# Patient Record
Sex: FEMALE | Race: WHITE | NOT HISPANIC OR LATINO
[De-identification: names, ages, dates, MRNs, and addresses within clinical notes are randomized per-mention and may not be internally consistent; named-entity substitution may affect disease eponyms.]

---

## 2017-05-26 ENCOUNTER — APPOINTMENT (OUTPATIENT)
Dept: INTERNAL MEDICINE | Facility: CLINIC | Age: 82
End: 2017-05-26

## 2017-08-08 DIAGNOSIS — M85.80 OTHER SPECIFIED DISORDERS OF BONE DENSITY AND STRUCTURE, UNSPECIFIED SITE: ICD-10-CM

## 2017-08-08 DIAGNOSIS — E78.5 HYPERLIPIDEMIA, UNSPECIFIED: ICD-10-CM

## 2017-08-08 DIAGNOSIS — E03.9 HYPOTHYROIDISM, UNSPECIFIED: ICD-10-CM

## 2017-08-08 DIAGNOSIS — I10 ESSENTIAL (PRIMARY) HYPERTENSION: ICD-10-CM

## 2017-08-08 DIAGNOSIS — Z00.00 ENCOUNTER FOR GENERAL ADULT MEDICAL EXAMINATION W/OUT ABNORMAL FINDINGS: ICD-10-CM

## 2017-08-08 DIAGNOSIS — F32.9 MAJOR DEPRESSIVE DISORDER, SINGLE EPISODE, UNSPECIFIED: ICD-10-CM

## 2017-08-08 DIAGNOSIS — E55.9 VITAMIN D DEFICIENCY, UNSPECIFIED: ICD-10-CM

## 2017-08-08 DIAGNOSIS — Z91.19 PATIENT'S NONCOMPLIANCE WITH OTHER MEDICAL TREATMENT AND REGIMEN: ICD-10-CM

## 2017-08-29 ENCOUNTER — MEDICATION RENEWAL (OUTPATIENT)
Age: 82
End: 2017-08-29

## 2017-09-28 ENCOUNTER — OTHER (OUTPATIENT)
Age: 82
End: 2017-09-28

## 2017-10-10 ENCOUNTER — APPOINTMENT (OUTPATIENT)
Dept: INTERNAL MEDICINE | Facility: CLINIC | Age: 82
End: 2017-10-10
Payer: MEDICARE

## 2017-10-10 VITALS
OXYGEN SATURATION: 96 % | WEIGHT: 151 LBS | HEIGHT: 63 IN | DIASTOLIC BLOOD PRESSURE: 70 MMHG | TEMPERATURE: 98.6 F | HEART RATE: 76 BPM | RESPIRATION RATE: 22 BRPM | BODY MASS INDEX: 26.75 KG/M2 | SYSTOLIC BLOOD PRESSURE: 118 MMHG

## 2017-10-10 DIAGNOSIS — E55.9 VITAMIN D DEFICIENCY, UNSPECIFIED: ICD-10-CM

## 2017-10-10 PROCEDURE — 99214 OFFICE O/P EST MOD 30 MIN: CPT | Mod: 25

## 2017-10-10 PROCEDURE — 96372 THER/PROPH/DIAG INJ SC/IM: CPT

## 2017-10-10 RX ORDER — CYANOCOBALAMIN 1000 UG/ML
1000 INJECTION INTRAMUSCULAR; SUBCUTANEOUS
Qty: 0 | Refills: 0 | Status: COMPLETED | OUTPATIENT
Start: 2017-10-10

## 2017-10-10 RX ADMIN — CYANOCOBALAMIN 0 MCG/ML: 1000 INJECTION, SOLUTION INTRAMUSCULAR at 00:00

## 2017-11-17 ENCOUNTER — RX RENEWAL (OUTPATIENT)
Age: 82
End: 2017-11-17

## 2017-12-05 LAB
CHOLEST SERPL-MCNC: 299
GLUCOSE SERPL-MCNC: 100
HDLC SERPL-MCNC: 59
LDLC SERPL CALC-MCNC: 201

## 2017-12-08 ENCOUNTER — EMERGENCY (EMERGENCY)
Facility: HOSPITAL | Age: 82
LOS: 0 days | Discharge: HOME | End: 2017-12-08
Attending: EMERGENCY MEDICINE | Admitting: EMERGENCY MEDICINE
Payer: MEDICARE

## 2017-12-08 VITALS
DIASTOLIC BLOOD PRESSURE: 84 MMHG | TEMPERATURE: 98 F | SYSTOLIC BLOOD PRESSURE: 196 MMHG | HEART RATE: 92 BPM | WEIGHT: 119.93 LBS | RESPIRATION RATE: 18 BRPM | HEIGHT: 62 IN

## 2017-12-08 DIAGNOSIS — S39.92XA UNSPECIFIED INJURY OF LOWER BACK, INITIAL ENCOUNTER: ICD-10-CM

## 2017-12-08 DIAGNOSIS — M51.36 OTHER INTERVERTEBRAL DISC DEGENERATION, LUMBAR REGION: ICD-10-CM
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
## 2017-12-08 PROCEDURE — 70450 CT HEAD/BRAIN W/O DYE: CPT | Mod: 26

## 2017-12-08 PROCEDURE — 72131 CT LUMBAR SPINE W/O DYE: CPT | Mod: 26

## 2017-12-08 PROCEDURE — 72100 X-RAY EXAM L-S SPINE 2/3 VWS: CPT | Mod: 26

## 2017-12-08 PROCEDURE — 99283 EMERGENCY DEPT VISIT LOW MDM: CPT | Mod: 25

## 2017-12-08 RX ORDER — ACETAMINOPHEN 500 MG
650 TABLET ORAL ONCE
Qty: 0 | Refills: 0 | Status: COMPLETED | OUTPATIENT
Start: 2017-12-08 | End: 2017-12-08

## 2017-12-08 RX ADMIN — Medication 650 MILLIGRAM(S): at 05:20

## 2017-12-08 NOTE — ED ADULT NURSE NOTE - NS ED NURSE DC INFO COMPLEXITY
Straightforward: Basic instructions, no meds, no home treatment/Simple: Patient demonstrates quick and easy understanding/Verbalized Understanding/Patient asked questions

## 2017-12-08 NOTE — ED ADULT TRIAGE NOTE - CHIEF COMPLAINT QUOTE
patient states she was getting out of a recliner, landed on her back. denies head injury or blood thinners.  pt c/o lower back pain, unable to ambulate due to pain.

## 2017-12-08 NOTE — ED ADULT NURSE NOTE - OBJECTIVE STATEMENT
Pt presents to ER s/p mechanical fall out of recliner chair while at home. Pt denies blood thinners, LOC, CP. Pt presents to ER unable to ambulate, denies numbness/tingling. Skin is warm, dry and intact. AO x 3 oriented to baseline, normal breathing pattern with no difficulty

## 2017-12-08 NOTE — ED PROVIDER NOTE - PROGRESS NOTE DETAILS
No saddle anesthesia no urinary or bowel incontinence. Able to ambulate with assistance. CT brain neg. CT lumbar showing degenerative disc disease and herniations, patient knows of this and it was discussed at [facility] in front of daughter and . This is all known disease. It was discussed at [facility] the need for use of her walker and waiting for assistance to ambulate for fear of further falling. Pt verbalized understanding. Pt wants to leave ED and go home. Stable entire time. Return for further concerns or complaints,

## 2017-12-08 NOTE — ED PROVIDER NOTE - OBJECTIVE STATEMENT
Pt comes to the ED complaining of falling today in the morning. Pt states that she got up to go to bed and turned the lights off and then fell. Pt states fell back striking her buttocks on the floor. No LOC and no obvious deformities. Pt with TTP of the coccyx area. As per daughter patient has trouble ambulating in general and is supposed to use a walker but does not and falls constantly. Has been in rehab for this prior and had aide come to the house to help with PT but she threw them out because she no longer liked taking orders.

## 2018-01-17 NOTE — ED ADULT NURSE NOTE - NS ED NURSE RECORD ANOTHER HT AND WT
Initial Anesthesia Post-op Note    Patient: Ashley Payne  Procedure(s) Performed: LEFT FIRST METATARSAL IMPLANT PHALANGEAL JOINT AND EXCISION SOFT TISSUE MASS X  Anesthesia type: General    Last 24 I/O:   Intake/Output Summary (Last 24 hours) at 01/17/18 1505  Last data filed at 01/17/18 1505   Gross per 24 hour   Intake              100 ml   Output                0 ml   Net              100 ml       PATIENT LOCATION: PACU Phase 1  POST-OP VITAL SIGNS: stable  LEVEL OF CONSCIOUSNESS: participates in exam, answers questions appropriately and responds to stimulation  RESPIRATORY STATUS: spontaneous ventilation, unassisted and face mask  CARDIOVASCULAR: blood pressure returned to baseline  HYDRATION: euvolemic    PAIN MANAGEMENT: well controlled  NAUSEA: None  AIRWAY PATENCY: patent  POST-OP ASSESSMENT: no complications, patient tolerated procedure well with no complications and no evidence of recall  COMPLICATIONS: none  HANDOFF:  Handoff to receiving nurse was performed and questions were answered      
Yes

## 2018-03-06 ENCOUNTER — RX RENEWAL (OUTPATIENT)
Age: 83
End: 2018-03-06

## 2018-03-12 ENCOUNTER — RX RENEWAL (OUTPATIENT)
Age: 83
End: 2018-03-12

## 2018-03-20 ENCOUNTER — RX RENEWAL (OUTPATIENT)
Age: 83
End: 2018-03-20

## 2018-04-13 ENCOUNTER — APPOINTMENT (OUTPATIENT)
Dept: INTERNAL MEDICINE | Facility: CLINIC | Age: 83
End: 2018-04-13

## 2018-04-23 ENCOUNTER — APPOINTMENT (OUTPATIENT)
Dept: INTERNAL MEDICINE | Facility: CLINIC | Age: 83
End: 2018-04-23
Payer: MEDICARE

## 2018-04-23 VITALS
HEART RATE: 87 BPM | WEIGHT: 133 LBS | TEMPERATURE: 97.6 F | OXYGEN SATURATION: 97 % | BODY MASS INDEX: 23.57 KG/M2 | DIASTOLIC BLOOD PRESSURE: 58 MMHG | SYSTOLIC BLOOD PRESSURE: 126 MMHG | RESPIRATION RATE: 22 BRPM | HEIGHT: 63 IN

## 2018-04-23 PROCEDURE — 90732 PPSV23 VACC 2 YRS+ SUBQ/IM: CPT | Mod: GY,GA

## 2018-04-23 PROCEDURE — 99214 OFFICE O/P EST MOD 30 MIN: CPT | Mod: 25

## 2018-04-23 PROCEDURE — G0009: CPT

## 2018-04-29 ENCOUNTER — EMERGENCY (EMERGENCY)
Facility: HOSPITAL | Age: 83
LOS: 0 days | Discharge: HOME | End: 2018-04-29
Attending: EMERGENCY MEDICINE | Admitting: EMERGENCY MEDICINE
Payer: MEDICARE

## 2018-04-29 VITALS
HEART RATE: 81 BPM | SYSTOLIC BLOOD PRESSURE: 190 MMHG | TEMPERATURE: 98 F | OXYGEN SATURATION: 100 % | WEIGHT: 179.9 LBS | HEIGHT: 66 IN | RESPIRATION RATE: 18 BRPM | DIASTOLIC BLOOD PRESSURE: 68 MMHG

## 2018-04-29 DIAGNOSIS — Z96.652 PRESENCE OF LEFT ARTIFICIAL KNEE JOINT: ICD-10-CM

## 2018-04-29 DIAGNOSIS — S49.91XA UNSPECIFIED INJURY OF RIGHT SHOULDER AND UPPER ARM, INITIAL ENCOUNTER: ICD-10-CM

## 2018-04-29 DIAGNOSIS — Z79.82 LONG TERM (CURRENT) USE OF ASPIRIN: ICD-10-CM
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
## 2018-04-29 DIAGNOSIS — Z96.642 PRESENCE OF LEFT ARTIFICIAL HIP JOINT: ICD-10-CM

## 2018-04-29 DIAGNOSIS — I10 ESSENTIAL (PRIMARY) HYPERTENSION: ICD-10-CM

## 2018-04-29 DIAGNOSIS — S40.011A CONTUSION OF RIGHT SHOULDER, INITIAL ENCOUNTER: ICD-10-CM
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
## 2018-04-29 LAB
ALBUMIN SERPL ELPH-MCNC: 4 G/DL — SIGNIFICANT CHANGE UP (ref 3.3–5)
ALP SERPL-CCNC: 84 U/L — SIGNIFICANT CHANGE UP (ref 40–120)
ALT FLD-CCNC: 21 U/L — SIGNIFICANT CHANGE UP (ref 12–78)
ANION GAP SERPL CALC-SCNC: 7 MMOL/L — SIGNIFICANT CHANGE UP (ref 5–17)
APTT BLD: 26.5 SEC — LOW (ref 27.5–37.4)
AST SERPL-CCNC: 18 U/L — SIGNIFICANT CHANGE UP (ref 15–37)
BASOPHILS # BLD AUTO: 0.04 K/UL — SIGNIFICANT CHANGE UP (ref 0–0.2)
BASOPHILS NFR BLD AUTO: 0.6 % — SIGNIFICANT CHANGE UP (ref 0–2)
BILIRUB SERPL-MCNC: 0.3 MG/DL — SIGNIFICANT CHANGE UP (ref 0.2–1.2)
BLD GP AB SCN SERPL QL: SIGNIFICANT CHANGE UP
BUN SERPL-MCNC: 34 MG/DL — HIGH (ref 7–23)
CALCIUM SERPL-MCNC: 9.3 MG/DL — SIGNIFICANT CHANGE UP (ref 8.5–10.1)
CHLORIDE SERPL-SCNC: 113 MMOL/L — HIGH (ref 96–108)
CO2 SERPL-SCNC: 24 MMOL/L — SIGNIFICANT CHANGE UP (ref 22–31)
CREAT SERPL-MCNC: 1.72 MG/DL — HIGH (ref 0.5–1.3)
EOSINOPHIL # BLD AUTO: 0.31 K/UL — SIGNIFICANT CHANGE UP (ref 0–0.5)
EOSINOPHIL NFR BLD AUTO: 4.7 % — SIGNIFICANT CHANGE UP (ref 0–6)
GLUCOSE SERPL-MCNC: 110 MG/DL — HIGH (ref 70–99)
HCT VFR BLD CALC: 35.8 % — SIGNIFICANT CHANGE UP (ref 34.5–45)
HGB BLD-MCNC: 11.8 G/DL — SIGNIFICANT CHANGE UP (ref 11.5–15.5)
IMM GRANULOCYTES NFR BLD AUTO: 0.5 % — SIGNIFICANT CHANGE UP (ref 0–1.5)
INR BLD: 0.97 RATIO — SIGNIFICANT CHANGE UP (ref 0.88–1.16)
LYMPHOCYTES # BLD AUTO: 0.81 K/UL — LOW (ref 1–3.3)
LYMPHOCYTES # BLD AUTO: 12.2 % — LOW (ref 13–44)
MCHC RBC-ENTMCNC: 30.9 PG — SIGNIFICANT CHANGE UP (ref 27–34)
MCHC RBC-ENTMCNC: 33 GM/DL — SIGNIFICANT CHANGE UP (ref 32–36)
MCV RBC AUTO: 93.7 FL — SIGNIFICANT CHANGE UP (ref 80–100)
MONOCYTES # BLD AUTO: 0.46 K/UL — SIGNIFICANT CHANGE UP (ref 0–0.9)
MONOCYTES NFR BLD AUTO: 6.9 % — SIGNIFICANT CHANGE UP (ref 2–14)
NEUTROPHILS # BLD AUTO: 5.01 K/UL — SIGNIFICANT CHANGE UP (ref 1.8–7.4)
NEUTROPHILS NFR BLD AUTO: 75.1 % — SIGNIFICANT CHANGE UP (ref 43–77)
NRBC # BLD: 0 /100 WBCS — SIGNIFICANT CHANGE UP (ref 0–0)
PLATELET # BLD AUTO: 175 K/UL — SIGNIFICANT CHANGE UP (ref 150–400)
POTASSIUM SERPL-MCNC: 3.9 MMOL/L — SIGNIFICANT CHANGE UP (ref 3.5–5.3)
POTASSIUM SERPL-SCNC: 3.9 MMOL/L — SIGNIFICANT CHANGE UP (ref 3.5–5.3)
PROT SERPL-MCNC: 8 GM/DL — SIGNIFICANT CHANGE UP (ref 6–8.3)
PROTHROM AB SERPL-ACNC: 10.5 SEC — SIGNIFICANT CHANGE UP (ref 9.8–12.7)
RBC # BLD: 3.82 M/UL — SIGNIFICANT CHANGE UP (ref 3.8–5.2)
RBC # FLD: 12.4 % — SIGNIFICANT CHANGE UP (ref 10.3–14.5)
SODIUM SERPL-SCNC: 144 MMOL/L — SIGNIFICANT CHANGE UP (ref 135–145)
TYPE + AB SCN PNL BLD: SIGNIFICANT CHANGE UP
WBC # BLD: 6.66 K/UL — SIGNIFICANT CHANGE UP (ref 3.8–10.5)
WBC # FLD AUTO: 6.66 K/UL — SIGNIFICANT CHANGE UP (ref 3.8–10.5)

## 2018-04-29 PROCEDURE — 73200 CT UPPER EXTREMITY W/O DYE: CPT | Mod: 26,RT

## 2018-04-29 PROCEDURE — 73030 X-RAY EXAM OF SHOULDER: CPT | Mod: 26,RT

## 2018-04-29 PROCEDURE — 74176 CT ABD & PELVIS W/O CONTRAST: CPT | Mod: 26

## 2018-04-29 PROCEDURE — 72125 CT NECK SPINE W/O DYE: CPT | Mod: 26

## 2018-04-29 PROCEDURE — 71100 X-RAY EXAM RIBS UNI 2 VIEWS: CPT | Mod: 26,RT

## 2018-04-29 PROCEDURE — 70450 CT HEAD/BRAIN W/O DYE: CPT | Mod: 26

## 2018-04-29 PROCEDURE — 99284 EMERGENCY DEPT VISIT MOD MDM: CPT

## 2018-04-29 PROCEDURE — 76377 3D RENDER W/INTRP POSTPROCES: CPT | Mod: 26

## 2018-04-29 PROCEDURE — 93010 ELECTROCARDIOGRAM REPORT: CPT

## 2018-04-29 PROCEDURE — 73060 X-RAY EXAM OF HUMERUS: CPT | Mod: 26,RT

## 2018-04-29 PROCEDURE — 72190 X-RAY EXAM OF PELVIS: CPT | Mod: 26

## 2018-04-29 PROCEDURE — 71250 CT THORAX DX C-: CPT | Mod: 26

## 2018-04-29 RX ORDER — SODIUM CHLORIDE 9 MG/ML
500 INJECTION INTRAMUSCULAR; INTRAVENOUS; SUBCUTANEOUS ONCE
Qty: 0 | Refills: 0 | Status: COMPLETED | OUTPATIENT
Start: 2018-04-29 | End: 2018-04-29

## 2018-04-29 RX ORDER — ONDANSETRON 8 MG/1
4 TABLET, FILM COATED ORAL ONCE
Qty: 0 | Refills: 0 | Status: COMPLETED | OUTPATIENT
Start: 2018-04-29 | End: 2018-04-29

## 2018-04-29 RX ORDER — MORPHINE SULFATE 50 MG/1
4 CAPSULE, EXTENDED RELEASE ORAL ONCE
Qty: 0 | Refills: 0 | Status: DISCONTINUED | OUTPATIENT
Start: 2018-04-29 | End: 2018-04-29

## 2018-04-29 RX ADMIN — SODIUM CHLORIDE 500 MILLILITER(S): 9 INJECTION INTRAMUSCULAR; INTRAVENOUS; SUBCUTANEOUS at 14:16

## 2018-04-29 RX ADMIN — MORPHINE SULFATE 4 MILLIGRAM(S): 50 CAPSULE, EXTENDED RELEASE ORAL at 14:00

## 2018-04-29 RX ADMIN — ONDANSETRON 4 MILLIGRAM(S): 8 TABLET, FILM COATED ORAL at 13:55

## 2018-04-29 NOTE — ED ADULT TRIAGE NOTE - CHIEF COMPLAINT QUOTE
Pt was folding laundry, trip over something , she fell into pile of laundry and hit Rt shoulder on dresser , neg head injury , neg loc, fall with witnessed by spouse, NEG HEAD, BACK , neck pain

## 2018-04-29 NOTE — ED PROVIDER NOTE - MEDICAL DECISION MAKING DETAILS
Pt with Hx of HTN on 81 mg ASA BIBA from home presents s/p trip and fall at home onto rt shoulder. No head injury or LOC. Pt now c/o chest wall pain and rt shoulder pain, ttp on exam. Plan TA, XR, CT Chest, AP, head, XR shoulder and pelvis, EKG, labs, pain medication, observe, reassess.

## 2018-04-29 NOTE — ED PROVIDER NOTE - OBJECTIVE STATEMENT
Exam begun 13:05, 86 y/o F on 81 mg ASA daily BIBA from home s/p trip and fall onto carpeted floor with her rt shoulder. Denies head injury or LOC. Unable to get up on her own but denies back or hip pain at this time. Pt now c/o rt shoulder and upper arm pain. Pain is sharp, moderate to severe. No numbness, tingling, or weakness. No B/B changes. Exam begun 13:05, 88 y/o F with Hx of HTN, hip replacement and knee replacement left side, on 81 mg ASA daily BIBA from home s/p trip and fall onto carpeted floor with her rt shoulder. Denies head injury or LOC. Unable to get up on her own but denies back or hip pain at this time. Pt now c/o rt shoulder and upper arm pain. Pain is sharp, moderate to severe. No numbness, tingling, or weakness. No B/B changes.

## 2018-04-29 NOTE — ED PROVIDER NOTE - PROGRESS NOTE DETAILS
Dr. Dodge:  Case signed out to Dr. Chopra:  [][ CT R shoulder,   [] ambulation trial.   Expect pt TBD in arm sling if CT negative & pt + ambulatory. Dr. Dodge:  CTs negative, incl. chest.  XRs R shoulder negative.  Pt still very tender to palpation R chest wall, shoulder.  Ordered CT R shoulder & R rib XRs for further eval. Attending Keysha: Pty ambulatory with walker which is baseline. No fracture on CT of shoulder, however placed sling and instructed to f/u ortho, TERRY KHAN

## 2018-04-29 NOTE — ED PROVIDER NOTE - ENMT, MLM
Airway patent, Nasal mucosa clear. Mouth with mildly dry mucosa. Throat has no vesicles, no oropharyngeal exudates and uvula is midline. NC/AT. No evidence of facial injury. Neck is NT, Supple.

## 2018-04-29 NOTE — ED ADULT NURSE NOTE - PAIN: BODY LOCATION
1. global amnesia resolved  2. no evidence of CVA  3. NSR  4. normal echo    Recommend  discharge home  no lipitor no need for monitor  followup with Dr. Tatum shoulder/chest/Right:

## 2018-04-29 NOTE — ED PROVIDER NOTE - CONSTITUTIONAL, MLM
Well appearing, well nourished, awake, alert, oriented to person, place, time/situation and in no apparent respiratory distress. Moderate distress due to shoulder pain normal...

## 2018-05-07 DIAGNOSIS — R79.9 ABNORMAL FINDING OF BLOOD CHEMISTRY, UNSPECIFIED: ICD-10-CM

## 2018-05-21 ENCOUNTER — RX RENEWAL (OUTPATIENT)
Age: 83
End: 2018-05-21

## 2018-05-21 RX ORDER — LEVOTHYROXINE SODIUM 0.05 MG/1
50 TABLET ORAL
Qty: 90 | Refills: 1 | Status: ACTIVE | COMMUNITY
Start: 2017-11-17 | End: 1900-01-01

## 2018-05-21 RX ORDER — CLONIDINE HYDROCHLORIDE 0.2 MG/1
0.2 TABLET ORAL
Qty: 120 | Refills: 5 | Status: ACTIVE | COMMUNITY
Start: 2018-05-21 | End: 1900-01-01

## 2018-07-16 ENCOUNTER — TRANSCRIPTION ENCOUNTER (OUTPATIENT)
Age: 83
End: 2018-07-16

## 2018-07-22 ENCOUNTER — INPATIENT (INPATIENT)
Facility: HOSPITAL | Age: 83
LOS: 7 days | Discharge: NURSING, REHAB OR LONG-TERM CARE | End: 2018-07-30
Attending: INTERNAL MEDICINE | Admitting: INTERNAL MEDICINE
Payer: MEDICARE

## 2018-07-22 VITALS
RESPIRATION RATE: 18 BRPM | OXYGEN SATURATION: 100 % | DIASTOLIC BLOOD PRESSURE: 78 MMHG | SYSTOLIC BLOOD PRESSURE: 200 MMHG | HEIGHT: 63 IN | HEART RATE: 98 BPM | TEMPERATURE: 98 F | WEIGHT: 199.96 LBS

## 2018-07-22 LAB
ALBUMIN SERPL ELPH-MCNC: 4.1 G/DL — SIGNIFICANT CHANGE UP (ref 3.3–5)
ALP SERPL-CCNC: 77 U/L — SIGNIFICANT CHANGE UP (ref 40–120)
ALT FLD-CCNC: 24 U/L — SIGNIFICANT CHANGE UP (ref 12–78)
ANION GAP SERPL CALC-SCNC: 8 MMOL/L — SIGNIFICANT CHANGE UP (ref 5–17)
APPEARANCE UR: CLEAR — SIGNIFICANT CHANGE UP
APTT BLD: 28.3 SEC — SIGNIFICANT CHANGE UP (ref 27.5–37.4)
AST SERPL-CCNC: 21 U/L — SIGNIFICANT CHANGE UP (ref 15–37)
BACTERIA # UR AUTO: ABNORMAL
BASOPHILS # BLD AUTO: 0.04 K/UL — SIGNIFICANT CHANGE UP (ref 0–0.2)
BASOPHILS NFR BLD AUTO: 0.5 % — SIGNIFICANT CHANGE UP (ref 0–2)
BILIRUB SERPL-MCNC: 0.3 MG/DL — SIGNIFICANT CHANGE UP (ref 0.2–1.2)
BILIRUB UR-MCNC: NEGATIVE — SIGNIFICANT CHANGE UP
BLD GP AB SCN SERPL QL: SIGNIFICANT CHANGE UP
BUN SERPL-MCNC: 26 MG/DL — HIGH (ref 7–23)
CALCIUM SERPL-MCNC: 9.4 MG/DL — SIGNIFICANT CHANGE UP (ref 8.5–10.1)
CHLORIDE SERPL-SCNC: 113 MMOL/L — HIGH (ref 96–108)
CK SERPL-CCNC: 96 U/L — SIGNIFICANT CHANGE UP (ref 26–192)
CO2 SERPL-SCNC: 21 MMOL/L — LOW (ref 22–31)
COLOR SPEC: YELLOW — SIGNIFICANT CHANGE UP
CREAT SERPL-MCNC: 1.45 MG/DL — HIGH (ref 0.5–1.3)
DIFF PNL FLD: NEGATIVE — SIGNIFICANT CHANGE UP
EOSINOPHIL # BLD AUTO: 0.25 K/UL — SIGNIFICANT CHANGE UP (ref 0–0.5)
EOSINOPHIL NFR BLD AUTO: 3.4 % — SIGNIFICANT CHANGE UP (ref 0–6)
EPI CELLS # UR: NEGATIVE — SIGNIFICANT CHANGE UP
GLUCOSE SERPL-MCNC: 101 MG/DL — HIGH (ref 70–99)
GLUCOSE UR QL: NEGATIVE MG/DL — SIGNIFICANT CHANGE UP
HCT VFR BLD CALC: 37 % — SIGNIFICANT CHANGE UP (ref 34.5–45)
HGB BLD-MCNC: 12.2 G/DL — SIGNIFICANT CHANGE UP (ref 11.5–15.5)
HYALINE CASTS # UR AUTO: ABNORMAL /LPF
IMM GRANULOCYTES NFR BLD AUTO: 0.5 % — SIGNIFICANT CHANGE UP (ref 0–1.5)
INR BLD: 0.98 RATIO — SIGNIFICANT CHANGE UP (ref 0.88–1.16)
KETONES UR-MCNC: NEGATIVE — SIGNIFICANT CHANGE UP
LEUKOCYTE ESTERASE UR-ACNC: ABNORMAL
LYMPHOCYTES # BLD AUTO: 1.14 K/UL — SIGNIFICANT CHANGE UP (ref 1–3.3)
LYMPHOCYTES # BLD AUTO: 15.5 % — SIGNIFICANT CHANGE UP (ref 13–44)
MAGNESIUM SERPL-MCNC: 1.6 MG/DL — SIGNIFICANT CHANGE UP (ref 1.6–2.6)
MCHC RBC-ENTMCNC: 30.7 PG — SIGNIFICANT CHANGE UP (ref 27–34)
MCHC RBC-ENTMCNC: 33 GM/DL — SIGNIFICANT CHANGE UP (ref 32–36)
MCV RBC AUTO: 93.2 FL — SIGNIFICANT CHANGE UP (ref 80–100)
MONOCYTES # BLD AUTO: 0.4 K/UL — SIGNIFICANT CHANGE UP (ref 0–0.9)
MONOCYTES NFR BLD AUTO: 5.4 % — SIGNIFICANT CHANGE UP (ref 2–14)
NEUTROPHILS # BLD AUTO: 5.49 K/UL — SIGNIFICANT CHANGE UP (ref 1.8–7.4)
NEUTROPHILS NFR BLD AUTO: 74.7 % — SIGNIFICANT CHANGE UP (ref 43–77)
NITRITE UR-MCNC: NEGATIVE — SIGNIFICANT CHANGE UP
NRBC # BLD: 0 /100 WBCS — SIGNIFICANT CHANGE UP (ref 0–0)
PH UR: 5 — SIGNIFICANT CHANGE UP (ref 5–8)
PLATELET # BLD AUTO: 207 K/UL — SIGNIFICANT CHANGE UP (ref 150–400)
POTASSIUM SERPL-MCNC: 3.9 MMOL/L — SIGNIFICANT CHANGE UP (ref 3.5–5.3)
POTASSIUM SERPL-SCNC: 3.9 MMOL/L — SIGNIFICANT CHANGE UP (ref 3.5–5.3)
PROT SERPL-MCNC: 8.4 GM/DL — HIGH (ref 6–8.3)
PROT UR-MCNC: 30 MG/DL
PROTHROM AB SERPL-ACNC: 10.6 SEC — SIGNIFICANT CHANGE UP (ref 9.8–12.7)
RBC # BLD: 3.97 M/UL — SIGNIFICANT CHANGE UP (ref 3.8–5.2)
RBC # FLD: 12.9 % — SIGNIFICANT CHANGE UP (ref 10.3–14.5)
RBC CASTS # UR COMP ASSIST: ABNORMAL /HPF (ref 0–4)
SODIUM SERPL-SCNC: 142 MMOL/L — SIGNIFICANT CHANGE UP (ref 135–145)
SP GR SPEC: 1.01 — SIGNIFICANT CHANGE UP (ref 1.01–1.02)
TROPONIN I SERPL-MCNC: <0.015 NG/ML — SIGNIFICANT CHANGE UP (ref 0.01–0.04)
TYPE + AB SCN PNL BLD: SIGNIFICANT CHANGE UP
UROBILINOGEN FLD QL: NEGATIVE MG/DL — SIGNIFICANT CHANGE UP
WBC # BLD: 7.36 K/UL — SIGNIFICANT CHANGE UP (ref 3.8–10.5)
WBC # FLD AUTO: 7.36 K/UL — SIGNIFICANT CHANGE UP (ref 3.8–10.5)
WBC UR QL: ABNORMAL

## 2018-07-22 PROCEDURE — 72125 CT NECK SPINE W/O DYE: CPT | Mod: 26

## 2018-07-22 PROCEDURE — 74177 CT ABD & PELVIS W/CONTRAST: CPT | Mod: 26

## 2018-07-22 PROCEDURE — 71260 CT THORAX DX C+: CPT | Mod: 26

## 2018-07-22 PROCEDURE — 70450 CT HEAD/BRAIN W/O DYE: CPT | Mod: 26

## 2018-07-22 PROCEDURE — 93010 ELECTROCARDIOGRAM REPORT: CPT

## 2018-07-22 PROCEDURE — 99285 EMERGENCY DEPT VISIT HI MDM: CPT

## 2018-07-22 PROCEDURE — 71045 X-RAY EXAM CHEST 1 VIEW: CPT | Mod: 26

## 2018-07-22 RX ORDER — LIDOCAINE 4 G/100G
1 CREAM TOPICAL DAILY
Qty: 0 | Refills: 0 | Status: DISCONTINUED | OUTPATIENT
Start: 2018-07-22 | End: 2018-07-30

## 2018-07-22 RX ORDER — SODIUM CHLORIDE 9 MG/ML
1000 INJECTION INTRAMUSCULAR; INTRAVENOUS; SUBCUTANEOUS
Qty: 0 | Refills: 0 | Status: DISCONTINUED | OUTPATIENT
Start: 2018-07-22 | End: 2018-07-23

## 2018-07-22 RX ORDER — TRAMADOL HYDROCHLORIDE 50 MG/1
50 TABLET ORAL ONCE
Qty: 0 | Refills: 0 | Status: DISCONTINUED | OUTPATIENT
Start: 2018-07-22 | End: 2018-07-22

## 2018-07-22 RX ORDER — HYDRALAZINE HCL 50 MG
10 TABLET ORAL THREE TIMES A DAY
Qty: 0 | Refills: 0 | Status: DISCONTINUED | OUTPATIENT
Start: 2018-07-22 | End: 2018-07-24

## 2018-07-22 RX ORDER — MORPHINE SULFATE 50 MG/1
1 CAPSULE, EXTENDED RELEASE ORAL EVERY 6 HOURS
Qty: 0 | Refills: 0 | Status: DISCONTINUED | OUTPATIENT
Start: 2018-07-22 | End: 2018-07-23

## 2018-07-22 RX ORDER — ACETAMINOPHEN 500 MG
1000 TABLET ORAL ONCE
Qty: 0 | Refills: 0 | Status: COMPLETED | OUTPATIENT
Start: 2018-07-22 | End: 2018-07-22

## 2018-07-22 RX ORDER — OXYCODONE AND ACETAMINOPHEN 5; 325 MG/1; MG/1
1 TABLET ORAL EVERY 6 HOURS
Qty: 0 | Refills: 0 | Status: DISCONTINUED | OUTPATIENT
Start: 2018-07-22 | End: 2018-07-25

## 2018-07-22 RX ORDER — ONDANSETRON 8 MG/1
4 TABLET, FILM COATED ORAL EVERY 8 HOURS
Qty: 0 | Refills: 0 | Status: DISCONTINUED | OUTPATIENT
Start: 2018-07-22 | End: 2018-07-22

## 2018-07-22 RX ORDER — ONDANSETRON 8 MG/1
4 TABLET, FILM COATED ORAL EVERY 8 HOURS
Qty: 0 | Refills: 0 | Status: DISCONTINUED | OUTPATIENT
Start: 2018-07-22 | End: 2018-07-30

## 2018-07-22 RX ORDER — AMLODIPINE BESYLATE 2.5 MG/1
5 TABLET ORAL DAILY
Qty: 0 | Refills: 0 | Status: DISCONTINUED | OUTPATIENT
Start: 2018-07-22 | End: 2018-07-24

## 2018-07-22 RX ORDER — ACETAMINOPHEN 500 MG
650 TABLET ORAL EVERY 6 HOURS
Qty: 0 | Refills: 0 | Status: DISCONTINUED | OUTPATIENT
Start: 2018-07-22 | End: 2018-07-30

## 2018-07-22 RX ORDER — LIDOCAINE 4 G/100G
1 CREAM TOPICAL ONCE
Qty: 0 | Refills: 0 | Status: COMPLETED | OUTPATIENT
Start: 2018-07-22 | End: 2018-07-22

## 2018-07-22 RX ORDER — HEPARIN SODIUM 5000 [USP'U]/ML
5000 INJECTION INTRAVENOUS; SUBCUTANEOUS EVERY 12 HOURS
Qty: 0 | Refills: 0 | Status: DISCONTINUED | OUTPATIENT
Start: 2018-07-22 | End: 2018-07-23

## 2018-07-22 RX ORDER — MORPHINE SULFATE 50 MG/1
1 CAPSULE, EXTENDED RELEASE ORAL ONCE
Qty: 0 | Refills: 0 | Status: DISCONTINUED | OUTPATIENT
Start: 2018-07-22 | End: 2018-07-22

## 2018-07-22 RX ADMIN — TRAMADOL HYDROCHLORIDE 50 MILLIGRAM(S): 50 TABLET ORAL at 07:20

## 2018-07-22 RX ADMIN — Medication 400 MILLIGRAM(S): at 00:34

## 2018-07-22 RX ADMIN — LIDOCAINE 1 PATCH: 4 CREAM TOPICAL at 12:14

## 2018-07-22 RX ADMIN — OXYCODONE AND ACETAMINOPHEN 1 TABLET(S): 5; 325 TABLET ORAL at 20:06

## 2018-07-22 RX ADMIN — Medication 1000 MILLIGRAM(S): at 01:04

## 2018-07-22 RX ADMIN — Medication 10 MILLIGRAM(S): at 22:44

## 2018-07-22 RX ADMIN — AMLODIPINE BESYLATE 5 MILLIGRAM(S): 2.5 TABLET ORAL at 09:44

## 2018-07-22 RX ADMIN — LIDOCAINE 1 PATCH: 4 CREAM TOPICAL at 03:04

## 2018-07-22 RX ADMIN — TRAMADOL HYDROCHLORIDE 50 MILLIGRAM(S): 50 TABLET ORAL at 00:37

## 2018-07-22 RX ADMIN — HEPARIN SODIUM 5000 UNIT(S): 5000 INJECTION INTRAVENOUS; SUBCUTANEOUS at 17:24

## 2018-07-22 RX ADMIN — TRAMADOL HYDROCHLORIDE 50 MILLIGRAM(S): 50 TABLET ORAL at 09:46

## 2018-07-22 NOTE — ED ADULT NURSE REASSESSMENT NOTE - NS ED NURSE REASSESS COMMENT FT1
assisted with ambulation to and from the bathroom. Pt tolerated well. Warm blankets provided. Call bell within reach. Awaiting for hospitalist. Will cont to monitor for safety and comfort.

## 2018-07-22 NOTE — ED PROVIDER NOTE - PROGRESS NOTE DETAILS
Lives with one family member who is wheelchair bound who cannot help her if she falls again.  States pt. is not eating and drinking well, is up all night and sleeping on a recliner only all day.  Wants patient to consider rehab.  Prefers [facility] if possible.

## 2018-07-22 NOTE — ED ADULT NURSE NOTE - ED STAT RN HANDOFF DETAILS
Pt received from prior RN alert and able to answer questions appropriately. Pt reports that she had temporary pain relief with medication, but that pain is returned at this time. MD notified and will medicate for pain.  Will monitor for relief. Resp even and unlabored.

## 2018-07-22 NOTE — ED ADULT NURSE REASSESSMENT NOTE - NS ED NURSE REASSESS COMMENT FT1
pt sleeping with no acute distress noted. Call bell within reach. Vitals stable. awaiting for hospitalist. Will cont to monitor for safety and comfort.

## 2018-07-22 NOTE — H&P ADULT - HISTORY OF PRESENT ILLNESS
88y female w/ PMH htn, CKD3 presents w/ left rib pain due to fall.  Pt last seen here in April in ED w/ fall and d/c home. 88y female w/ PMH htn, CKD3, OA, vertigo presents w/ left rib pain due to fall.  Pt last seen here in April in ED w/ fall and d/c home.  Pt states she is clumsy and falls a lot. Denies loc, no dizziness, cp, sob, palp.  Lives w/ daughter.  Pt states she can't recall her bp meds but her daughter will bring.  Complains of 10/10 left side rib pain.  Requesting pain meds.  Then wants to go home.       PMH: htn, ckd3  PSH:  left hip and knee replacement

## 2018-07-22 NOTE — ED ADULT NURSE REASSESSMENT NOTE - NS ED NURSE REASSESS COMMENT FT1
assisted with ambulation to and from the bathroom. Pt c/o worsening pain with movement and ambulation. Urine sample obtained and sent to lab. Updated plan of care to pt and family at bedside. Pt aware of admission. Will cont to monitor for safety and comfort. assisted with ambulation to and from the bathroom. Pt c/o worsening pain with movement and ambulation. Urine sample obtained and sent to lab. Denies sob or chest pain. Updated plan of care to pt and family at bedside. Pt aware of admission. Will cont to monitor for safety and comfort.

## 2018-07-22 NOTE — ED PROVIDER NOTE - CONSTITUTIONAL, MLM
normal... Well appearing, well nourished, awake, alert, oriented to person, place, time/situation and winces when she talks or moves or breathes deep.

## 2018-07-22 NOTE — ED ADULT NURSE REASSESSMENT NOTE - NS ED NURSE REASSESS COMMENT FT1
lidocaine patch applied on left rib. Pt tolerated well. vss. Will cont to monitor for safety and comfort.

## 2018-07-22 NOTE — H&P ADULT - NSHPPHYSICALEXAM_GEN_ALL_CORE
Vital Signs Last 24 Hrs  T(C): 36.7 (22 Jul 2018 07:04), Max: 36.8 (22 Jul 2018 00:05)  T(F): 98 (22 Jul 2018 07:04), Max: 98.2 (22 Jul 2018 00:05)  HR: 85 (22 Jul 2018 07:04) (85 - 98)  BP: 163/72 (22 Jul 2018 07:04) (163/69 - 200/78)  BP(mean): --  RR: 17 (22 Jul 2018 07:04) (17 - 18)  SpO2: 95% (22 Jul 2018 07:04) (95% - 100%) Vital Signs Last 24 Hrs  T(C): 36.7 (22 Jul 2018 07:04), Max: 36.8 (22 Jul 2018 00:05)  T(F): 98 (22 Jul 2018 07:04), Max: 98.2 (22 Jul 2018 00:05)  HR: 85 (22 Jul 2018 07:04) (85 - 98)  BP: 163/72 (22 Jul 2018 07:04) (163/69 - 200/78)  BP(mean): --  RR: 17 (22 Jul 2018 07:04) (17 - 18)  SpO2: 95% (22 Jul 2018 07:04) (95% - 100%)      PHYSICAL EXAM:  General: elderly female lying flat in bed; complaining of left rib pain  Neuro: AAOx3, no focal deficits  HEENT: NCAT, EOMI, dry MM  Neck: Soft and supple, No JVD  Respiratory: CTA b/l, no w/r/r  Cardiovascular: S1 and S2, RRR    - left chest wall tenderness to minimal palpation-  no ecchymosis  ; lidoderm patch   Gastrointestinal: +BS, soft, nonttp ; umbilical hernia   Extremities: No edema  Vascular: 2+ peripheral pulses  Musculoskeletal: 5/5 strength b/l UE and LE, sensation intact  Skin: No rashes

## 2018-07-22 NOTE — ED PROVIDER NOTE - MEDICAL DECISION MAKING DETAILS
Likely rib fractures s/p fall; since poor historian, will do EKG, CXR, labs and CTs of head, c spine, chest, abdomen and pelvis to r/o internal injury.

## 2018-07-22 NOTE — ED PROVIDER NOTE - OBJECTIVE STATEMENT
Pt. is an 87 yo F with a hx of HTN, hip replacement, knee replacement, renal insufficiency shown in labs in April 2018 BIB ambulance for pain in left lower ribs.  Pt. states she has had multiple falls the past few days.  Poor historian and unable to describe last fall.  Pt. c/o sharp pain in left lower ribs and left axilla and flank when she moves, breathes or talks.  Pt. states she lives at home with her daughter and son in law.  She states they are on their way to the hospital with more history. Pt. is an 89 yo F with a hx of HTN, hip replacement, knee replacement, renal insufficiency shown in labs in April 2018 BIB ambulance for pain in left lower ribs.  Pt. states she has had multiple falls the past few days.  Poor historian and unable to describe last fall.  Pt. c/o sharp pain in left lower ribs and left axilla and flank when she moves, breathes or talks.  Pt. states she lives at home with her daughter and son in law.  She states they are on their way to the hospital with more history. PMD Miguelangel

## 2018-07-22 NOTE — ED ADULT NURSE NOTE - OBJECTIVE STATEMENT
89 y/o female awake alert and oriented x4 BIBA c/o sudden onset of left flank pain. Pt states she tripped and fell on right side at home on Monday night. Pt was seen at the Urgent Care on Tuesday. Xray was negative. Denies new fall or injury. Pt describes pain as sharp pain. denies chest pain,sob,ha,dz,n/v/d/fever/chills or urinary sx.

## 2018-07-22 NOTE — ED PROVIDER NOTE - CARE PLAN
Principal Discharge DX:	Falls frequently  Secondary Diagnosis:	Closed fracture of multiple ribs of left side, initial encounter

## 2018-07-22 NOTE — ED PROVIDER NOTE - CARDIAC, MLM
Normal rate, regular rhythm.  Heart sounds S1, S2.  No murmurs, rubs or gallops. +left lower anterior and lateral rib tenderness; no chest crepitus

## 2018-07-22 NOTE — H&P ADULT - NSHPREVIEWOFSYSTEMS_GEN_ALL_CORE
General: No weakness, fevers, chills  HEENT: No HA, neck pain, no visual changes, no hearing loss   Resp: No cough, wheezing, hemoptysis; No shortness of breath  CV: No chest pain or palpitations  GI: No abdominal pain, no n/v/d, no blood in stool  : No f/u/d  Neuro: No weakness or numbness  MSK: left side 10/10 rib pain  SKIN: No itching, rashes, lesions  All other ROS negative unless indicated above.

## 2018-07-22 NOTE — H&P ADULT - NSHPLABSRESULTS_GEN_ALL_CORE
LABS: All Labs Reviewed:                        12.2   7.36  )-----------( 207      ( 22 Jul 2018 00:33 )             37.0     07-22    142  |  113<H>  |  26<H>  ----------------------------<  101<H>  3.9   |  21<L>  |  1.45<H>    Ca    9.4      22 Jul 2018 00:33  Mg     1.6     07-22    TPro  8.4<H>  /  Alb  4.1  /  TBili  0.3  /  DBili  x   /  AST  21  /  ALT  24  /  AlkPhos  77  07-22    PT/INR - ( 22 Jul 2018 00:33 )   PT: 10.6 sec;   INR: 0.98 ratio         PTT - ( 22 Jul 2018 00:33 )  PTT:28.3 sec  CARDIAC MARKERS ( 22 Jul 2018 00:33 )  <0.015 ng/mL / x     / 96 U/L / x     / x        < from: CT Abdomen and Pelvis w/ IV Cont (07.22.18 @ 01:56) >      IMPRESSION:  Chest:  1.  Nondisplaced fractures in the anterolateral aspects of the left   eighth and ninth ribs with small amount of edema/hemorrhage in the   adjacent chest wall.    2.  Age indeterminant moderate compression fracture in the inferior   endplate of T9.    Abdomen/pelvis:  1.  No evidence of acute traumatic injury abdomen or pelvis.   2.  Status post cholecystectomy with severe intrahepatic and extrahepatic   biliary ductal dilatation.  Correlate with liver function tests.  < end of copied text >    < from: CT Head No Cont (07.22.18 @ 01:52) >  IMPRESSION: No intracranial hemorrhage, mass effect, or displaced   calvarial fracture.  < end of copied text >    < from: CT Cervical Spine No Cont (07.22.18 @ 01:52) >  IMPRESSION:   Noevidence of acute intracranial hemorrhage, midline shift or CT   evidence of acute territorial infarct.  If the patient's symptoms persist, consider short interval follow-up head   CT or brain MRI if there are no MRI contraindications.  No acute cervical fracture or traumatic malalignment.  MRI would be required to evaluate the ligamentous structures at higher   sensitivity as well as for better evaluation of the cervical canal and   its contents.  < end of copied text >        MEDICATIONS  (STANDING):  amLODIPine   Tablet 5 milliGRAM(s) Oral daily  heparin  Injectable 5000 Unit(s) SubCutaneous every 12 hours  lidocaine   Patch 1 Patch Transdermal daily  sodium chloride 0.9%. 1000 milliLiter(s) (75 mL/Hr) IV Continuous <Continuous>    MEDICATIONS  (PRN):  acetaminophen   Tablet 650 milliGRAM(s) Oral every 6 hours PRN pain or temp > 100.4

## 2018-07-23 LAB
ANION GAP SERPL CALC-SCNC: 11 MMOL/L — SIGNIFICANT CHANGE UP (ref 5–17)
BUN SERPL-MCNC: 27 MG/DL — HIGH (ref 7–23)
CALCIUM SERPL-MCNC: 9.3 MG/DL — SIGNIFICANT CHANGE UP (ref 8.5–10.1)
CHLORIDE SERPL-SCNC: 111 MMOL/L — HIGH (ref 96–108)
CO2 SERPL-SCNC: 20 MMOL/L — LOW (ref 22–31)
CREAT SERPL-MCNC: 1.38 MG/DL — HIGH (ref 0.5–1.3)
GLUCOSE SERPL-MCNC: 100 MG/DL — HIGH (ref 70–99)
HCT VFR BLD CALC: 37.1 % — SIGNIFICANT CHANGE UP (ref 34.5–45)
HGB BLD-MCNC: 12.2 G/DL — SIGNIFICANT CHANGE UP (ref 11.5–15.5)
POTASSIUM SERPL-MCNC: 3.8 MMOL/L — SIGNIFICANT CHANGE UP (ref 3.5–5.3)
POTASSIUM SERPL-SCNC: 3.8 MMOL/L — SIGNIFICANT CHANGE UP (ref 3.5–5.3)
SODIUM SERPL-SCNC: 142 MMOL/L — SIGNIFICANT CHANGE UP (ref 135–145)

## 2018-07-23 PROCEDURE — 71045 X-RAY EXAM CHEST 1 VIEW: CPT | Mod: 26

## 2018-07-23 PROCEDURE — 99223 1ST HOSP IP/OBS HIGH 75: CPT

## 2018-07-23 RX ORDER — PANTOPRAZOLE SODIUM 20 MG/1
40 TABLET, DELAYED RELEASE ORAL
Qty: 0 | Refills: 0 | Status: DISCONTINUED | OUTPATIENT
Start: 2018-07-23 | End: 2018-07-30

## 2018-07-23 RX ORDER — OXYCODONE AND ACETAMINOPHEN 5; 325 MG/1; MG/1
1 TABLET ORAL ONCE
Qty: 0 | Refills: 0 | Status: DISCONTINUED | OUTPATIENT
Start: 2018-07-23 | End: 2018-07-23

## 2018-07-23 RX ADMIN — MORPHINE SULFATE 1 MILLIGRAM(S): 50 CAPSULE, EXTENDED RELEASE ORAL at 08:30

## 2018-07-23 RX ADMIN — LIDOCAINE 1 PATCH: 4 CREAM TOPICAL at 11:08

## 2018-07-23 RX ADMIN — OXYCODONE AND ACETAMINOPHEN 1 TABLET(S): 5; 325 TABLET ORAL at 09:49

## 2018-07-23 RX ADMIN — MORPHINE SULFATE 1 MILLIGRAM(S): 50 CAPSULE, EXTENDED RELEASE ORAL at 10:30

## 2018-07-23 RX ADMIN — OXYCODONE AND ACETAMINOPHEN 1 TABLET(S): 5; 325 TABLET ORAL at 06:07

## 2018-07-23 RX ADMIN — AMLODIPINE BESYLATE 5 MILLIGRAM(S): 2.5 TABLET ORAL at 06:07

## 2018-07-23 RX ADMIN — LIDOCAINE 1 PATCH: 4 CREAM TOPICAL at 00:00

## 2018-07-23 RX ADMIN — OXYCODONE AND ACETAMINOPHEN 1 TABLET(S): 5; 325 TABLET ORAL at 10:29

## 2018-07-23 RX ADMIN — PANTOPRAZOLE SODIUM 40 MILLIGRAM(S): 20 TABLET, DELAYED RELEASE ORAL at 18:01

## 2018-07-23 RX ADMIN — HEPARIN SODIUM 5000 UNIT(S): 5000 INJECTION INTRAVENOUS; SUBCUTANEOUS at 06:08

## 2018-07-23 NOTE — PHYSICAL THERAPY INITIAL EVALUATION ADULT - GENERAL OBSERVATIONS, REHAB EVAL
Pt was received on 2N, suipine, reluctant to participate with PT initially but responsive to pt education and encouragement. The pt eventually agreed to get OOB and ambulate with PT in order to be assessed prior to getting discharge.

## 2018-07-23 NOTE — PROGRESS NOTE ADULT - SUBJECTIVE AND OBJECTIVE BOX
c/c: left rib pain after fall.      HPI:  88y female w/ PMH htn, CKD3, OA, vertigo presents w/ left rib pain due to fall.  Pt last seen here in April in ED w/ fall and d/c home.  Pt states she is clumsy and falls a lot. Denies loc, no dizziness, cp, sob, palp.  Lives w/ daughter.  She was admitted with Fractures of ant/lat left 8-9th ribs w/ small amt edema/hemorrhage adjacent chest wall and age indeterminant T9 vertebral fracture.     : pt seen and examined this am. c/o pain at fracture sites. Wants to go home asap, but hasn't gotten out of bed yet.     Review of system- All 10 systems reviewed and is as per HPI otherwise negative.     VITALS  T(C): 36.3 (18 @ 11:49), Max: 37.1 (18 @ 22:25)  HR: 108 (18 @ 11:49) (80 - 113)  BP: 123/65 (18 @ 11:49) (123/65 - 207/85)  RR: 17 (18 @ 11:49) (16 - 17)  SpO2: 94% (18 @ 11:49) (92% - 95%)    PHYSICAL EXAM:    GENERAL: uncomfortable, in pain.  HEAD:  Atraumatic, Normocephalic  EYES: EOMI, PERRLA  HEENT: Moist mucous membranes  NECK: Supple, No JVD  NERVOUS SYSTEM: Non focal.  CHEST/LUNG: Clear to auscultation bilaterally, decreased air movement due to pain  HEART: S1, S2+, Regular rate and rhythm.  ABDOMEN: Soft, Nontender, Nondistended; Bowel sounds present  GENITOURINARY- Voiding, no palpable bladder  EXTREMITIES:  No clubbing, cyanosis, or edema  MUSCULOSKELETAL- left chest wall tenderness.  SKIN-no rash        LABS:                        12.2   7.36  )-----------( 207      ( 2018 00:33 )             37.0         142  |  111<H>  |  27<H>  ----------------------------<  100<H>  3.8   |  20<L>  |  1.38<H>    Ca    9.3      2018 05:50  Mg     1.6         TPro  8.4<H>  /  Alb  4.1  /  TBili  0.3  /  DBili  x   /  AST  21  /  ALT  24  /  AlkPhos  77      PT/INR - ( 2018 00:33 )   PT: 10.6 sec;   INR: 0.98 ratio         PTT - ( 2018 00:33 )  PTT:28.3 sec  Urinalysis Basic - ( 2018 02:55 )    Color: Yellow / Appearance: Clear / S.010 / pH: x  Gluc: x / Ketone: Negative  / Bili: Negative / Urobili: Negative mg/dL   Blood: x / Protein: 30 mg/dL / Nitrite: Negative   Leuk Esterase: Trace / RBC: 3-5 /HPF / WBC 6-10   Sq Epi: x / Non Sq Epi: Negative / Bacteria: TNTC    CARDIAC MARKERS ( 2018 00:33 )  <0.015 ng/mL / x     / 96 U/L / x     / x        MEDS  acetaminophen   Tablet 650 milliGRAM(s) Oral every 6 hours PRN  amLODIPine   Tablet 5 milliGRAM(s) Oral daily  heparin  Injectable 5000 Unit(s) SubCutaneous every 12 hours  hydrALAZINE Injectable 10 milliGRAM(s) IV Push three times a day PRN  lidocaine   Patch 1 Patch Transdermal daily  morphine  - Injectable 1 milliGRAM(s) IV Push every 6 hours PRN  ondansetron Injectable 4 milliGRAM(s) IV Push every 8 hours PRN  oxyCODONE    5 mG/acetaminophen 325 mG 1 Tablet(s) Oral every 6 hours PRN  sodium chloride 0.9%. 1000 milliLiter(s) IV Continuous <Continuous>    ASSESSMENT AND PLAN:  88y female PMH as above a/w     *recurrent falls, rib pain due to nondisplaced fractures ant/lat left 8-9th ribs w/ small amt edema/hemorrhage adjacent chest wall  - denies syncope  - CT head and Cspine unremarkable   - Trauma surgery eval pending  - Pain control  - PT eval    *age indeterminant moderate compression fracture in the inferior endplate T9  - due to falls, no back pain     *CKD3  -stable.     *Status post cholecystectomy with severe intrahepatic and extrahepatic   biliary ductal dilatation  - on imaging.    -LFTs normal  -asymptomatic.    *htn  - norvasc  - daughter to bring home meds    *dvt px  - heparin

## 2018-07-23 NOTE — PHYSICAL THERAPY INITIAL EVALUATION ADULT - ADDITIONAL COMMENTS
Pt reports that she does not like using a RW at home because of the space being narrow. The pt states that her family insists on her using RW for safety but the pt admits to making" stupid mistakes" while walking and while sitting in the different chairs that are present at home. The pt admits to having a hx of falls in the past.

## 2018-07-23 NOTE — PHYSICAL THERAPY INITIAL EVALUATION ADULT - PERTINENT HX OF CURRENT PROBLEM, REHAB EVAL
88y female w/ PMH/PSH htn, CKD3, Left DONTE, left TKA, OA, vertigo presents w/ left rib pain due to fall.  Pt last seen here in April in ED w/ fall and d/c home.  Pt states she is clumsy and falls a lot. - Found to have Closed fx of multiple left sided ribs. + CT head and Cspine unremarkable

## 2018-07-23 NOTE — PHYSICAL THERAPY INITIAL EVALUATION ADULT - MODALITIES TREATMENT COMMENTS
The pt was left sitting OOB and in a chair with chair alarm secured, cb, tray and phone in place. RN informed. The pt was in NAD at end of tx. The pt demonstrates slow overall movement quality. The pt remains a high falls risk due to intermittent forgetfulness, RN aware.

## 2018-07-24 LAB
ANION GAP SERPL CALC-SCNC: 10 MMOL/L — SIGNIFICANT CHANGE UP (ref 5–17)
BASOPHILS # BLD AUTO: 0.04 K/UL — SIGNIFICANT CHANGE UP (ref 0–0.2)
BASOPHILS NFR BLD AUTO: 0.5 % — SIGNIFICANT CHANGE UP (ref 0–2)
BUN SERPL-MCNC: 31 MG/DL — HIGH (ref 7–23)
CALCIUM SERPL-MCNC: 9 MG/DL — SIGNIFICANT CHANGE UP (ref 8.5–10.1)
CHLORIDE SERPL-SCNC: 111 MMOL/L — HIGH (ref 96–108)
CO2 SERPL-SCNC: 24 MMOL/L — SIGNIFICANT CHANGE UP (ref 22–31)
CREAT SERPL-MCNC: 1.68 MG/DL — HIGH (ref 0.5–1.3)
EOSINOPHIL # BLD AUTO: 0.21 K/UL — SIGNIFICANT CHANGE UP (ref 0–0.5)
EOSINOPHIL NFR BLD AUTO: 2.6 % — SIGNIFICANT CHANGE UP (ref 0–6)
GLUCOSE SERPL-MCNC: 139 MG/DL — HIGH (ref 70–99)
HCT VFR BLD CALC: 35.3 % — SIGNIFICANT CHANGE UP (ref 34.5–45)
HGB BLD-MCNC: 11.3 G/DL — LOW (ref 11.5–15.5)
IMM GRANULOCYTES NFR BLD AUTO: 0.1 % — SIGNIFICANT CHANGE UP (ref 0–1.5)
LYMPHOCYTES # BLD AUTO: 1.39 K/UL — SIGNIFICANT CHANGE UP (ref 1–3.3)
LYMPHOCYTES # BLD AUTO: 17.5 % — SIGNIFICANT CHANGE UP (ref 13–44)
MAGNESIUM SERPL-MCNC: 1.6 MG/DL — SIGNIFICANT CHANGE UP (ref 1.6–2.6)
MCHC RBC-ENTMCNC: 29.7 PG — SIGNIFICANT CHANGE UP (ref 27–34)
MCHC RBC-ENTMCNC: 32 GM/DL — SIGNIFICANT CHANGE UP (ref 32–36)
MCV RBC AUTO: 92.9 FL — SIGNIFICANT CHANGE UP (ref 80–100)
MONOCYTES # BLD AUTO: 0.52 K/UL — SIGNIFICANT CHANGE UP (ref 0–0.9)
MONOCYTES NFR BLD AUTO: 6.6 % — SIGNIFICANT CHANGE UP (ref 2–14)
NEUTROPHILS # BLD AUTO: 5.76 K/UL — SIGNIFICANT CHANGE UP (ref 1.8–7.4)
NEUTROPHILS NFR BLD AUTO: 72.7 % — SIGNIFICANT CHANGE UP (ref 43–77)
NRBC # BLD: 0 /100 WBCS — SIGNIFICANT CHANGE UP (ref 0–0)
PHOSPHATE SERPL-MCNC: 3.8 MG/DL — SIGNIFICANT CHANGE UP (ref 2.5–4.5)
PLATELET # BLD AUTO: 232 K/UL — SIGNIFICANT CHANGE UP (ref 150–400)
POTASSIUM SERPL-MCNC: 4.2 MMOL/L — SIGNIFICANT CHANGE UP (ref 3.5–5.3)
POTASSIUM SERPL-SCNC: 4.2 MMOL/L — SIGNIFICANT CHANGE UP (ref 3.5–5.3)
RBC # BLD: 3.8 M/UL — SIGNIFICANT CHANGE UP (ref 3.8–5.2)
RBC # FLD: 13.2 % — SIGNIFICANT CHANGE UP (ref 10.3–14.5)
SODIUM SERPL-SCNC: 145 MMOL/L — SIGNIFICANT CHANGE UP (ref 135–145)
WBC # BLD: 7.93 K/UL — SIGNIFICANT CHANGE UP (ref 3.8–10.5)
WBC # FLD AUTO: 7.93 K/UL — SIGNIFICANT CHANGE UP (ref 3.8–10.5)

## 2018-07-24 PROCEDURE — 99232 SBSQ HOSP IP/OBS MODERATE 35: CPT

## 2018-07-24 PROCEDURE — 71045 X-RAY EXAM CHEST 1 VIEW: CPT | Mod: 26

## 2018-07-24 RX ORDER — CHOLECALCIFEROL (VITAMIN D3) 125 MCG
2000 CAPSULE ORAL DAILY
Qty: 0 | Refills: 0 | Status: DISCONTINUED | OUTPATIENT
Start: 2018-07-24 | End: 2018-07-30

## 2018-07-24 RX ORDER — AMLODIPINE BESYLATE 2.5 MG/1
10 TABLET ORAL DAILY
Qty: 0 | Refills: 0 | Status: DISCONTINUED | OUTPATIENT
Start: 2018-07-24 | End: 2018-07-30

## 2018-07-24 RX ORDER — SERTRALINE 25 MG/1
50 TABLET, FILM COATED ORAL DAILY
Qty: 0 | Refills: 0 | Status: DISCONTINUED | OUTPATIENT
Start: 2018-07-24 | End: 2018-07-30

## 2018-07-24 RX ORDER — LEVOTHYROXINE SODIUM 125 MCG
50 TABLET ORAL DAILY
Qty: 0 | Refills: 0 | Status: DISCONTINUED | OUTPATIENT
Start: 2018-07-24 | End: 2018-07-30

## 2018-07-24 RX ORDER — SODIUM CHLORIDE 9 MG/ML
1000 INJECTION INTRAMUSCULAR; INTRAVENOUS; SUBCUTANEOUS
Qty: 0 | Refills: 0 | Status: DISCONTINUED | OUTPATIENT
Start: 2018-07-23 | End: 2018-07-24

## 2018-07-24 RX ORDER — LOSARTAN POTASSIUM 100 MG/1
50 TABLET, FILM COATED ORAL DAILY
Qty: 0 | Refills: 0 | Status: DISCONTINUED | OUTPATIENT
Start: 2018-07-24 | End: 2018-07-26

## 2018-07-24 RX ADMIN — Medication 2000 UNIT(S): at 17:39

## 2018-07-24 RX ADMIN — AMLODIPINE BESYLATE 5 MILLIGRAM(S): 2.5 TABLET ORAL at 05:09

## 2018-07-24 RX ADMIN — ONDANSETRON 4 MILLIGRAM(S): 8 TABLET, FILM COATED ORAL at 02:36

## 2018-07-24 RX ADMIN — PANTOPRAZOLE SODIUM 40 MILLIGRAM(S): 20 TABLET, DELAYED RELEASE ORAL at 05:09

## 2018-07-24 RX ADMIN — OXYCODONE AND ACETAMINOPHEN 1 TABLET(S): 5; 325 TABLET ORAL at 03:00

## 2018-07-24 RX ADMIN — LIDOCAINE 1 PATCH: 4 CREAM TOPICAL at 11:46

## 2018-07-24 RX ADMIN — Medication 10 MILLIGRAM(S): at 00:25

## 2018-07-24 RX ADMIN — SERTRALINE 50 MILLIGRAM(S): 25 TABLET, FILM COATED ORAL at 18:04

## 2018-07-24 RX ADMIN — OXYCODONE AND ACETAMINOPHEN 1 TABLET(S): 5; 325 TABLET ORAL at 02:28

## 2018-07-24 RX ADMIN — AMLODIPINE BESYLATE 10 MILLIGRAM(S): 2.5 TABLET ORAL at 17:39

## 2018-07-24 RX ADMIN — Medication 0.2 MILLIGRAM(S): at 21:02

## 2018-07-24 RX ADMIN — LOSARTAN POTASSIUM 50 MILLIGRAM(S): 100 TABLET, FILM COATED ORAL at 17:39

## 2018-07-24 NOTE — PROGRESS NOTE ADULT - SUBJECTIVE AND OBJECTIVE BOX
c/c: left rib pain after fall.      HPI:  88y female w/ PMH htn, CKD3, OA, vertigo presents w/ left rib pain due to fall.  Pt last seen here in April in ED w/ fall and d/c home.  Pt states she is clumsy and falls a lot. Denies loc, no dizziness, cp, sob, palp.  Lives w/ daughter.  She was admitted with Fractures of ant/lat left 8-9th ribs w/ small amt edema/hemorrhage adjacent chest wall and age indeterminant T9 vertebral fracture.     7/24: pt seen and examined. Feeling better. still with left rib pain, but slowly improving. Not drinking much water. Yesterday had 2 episodes of vomiting. None today.    Review of system- All 10 systems reviewed and is as per HPI otherwise negative.     Vital Signs Last 24 Hrs  T(C): 36.8 (24 Jul 2018 12:09), Max: 37 (23 Jul 2018 23:54)  T(F): 98.2 (24 Jul 2018 12:09), Max: 98.6 (23 Jul 2018 23:54)  HR: 93 (24 Jul 2018 12:09) (93 - 110)  BP: 155/59 (24 Jul 2018 12:09) (125/90 - 172/83)  RR: 17 (24 Jul 2018 12:09) (16 - 18)  SpO2: 94% (24 Jul 2018 12:09) (94% - 97%)    PHYSICAL EXAM:    GENERAL: elderly female, laying in bed, comfortable, NAD  HEAD:  Atraumatic, Normocephalic  EYES: EOMI, PERRLA  HEENT: Moist mucous membranes  NECK: Supple, No JVD  NERVOUS SYSTEM: Non focal.  CHEST/LUNG: +wheeze this am, better now, decreased air movement due to pain  HEART: S1, S2+, Regular rate and rhythm.  ABDOMEN: Soft, Nontender, Nondistended; Bowel sounds present  GENITOURINARY- Voiding, no palpable bladder  EXTREMITIES:  No clubbing, cyanosis, or edema  MUSCULOSKELETAL- left chest wall tenderness.  SKIN-no rash  LABS:                        11.3   7.93  )-----------( 232      ( 24 Jul 2018 09:56 )             35.3     07-24    145  |  111<H>  |  31<H>  ----------------------------<  139<H>  4.2   |  24  |  1.68<H>    Ca    9.0      24 Jul 2018 09:56  Phos  3.8     07-24  Mg     1.6     07-24      MEDICATIONS  (STANDING):  amLODIPine   Tablet 5 milliGRAM(s) Oral daily  lidocaine   Patch 1 Patch Transdermal daily  pantoprazole    Tablet 40 milliGRAM(s) Oral before breakfast    MEDICATIONS  (PRN):  acetaminophen   Tablet 650 milliGRAM(s) Oral every 6 hours PRN pain or temp > 100.4  hydrALAZINE Injectable 10 milliGRAM(s) IV Push three times a day PRN sbp > 160  morphine  - Injectable 1 milliGRAM(s) IV Push every 6 hours PRN Severe Pain (7 - 10)  ondansetron Injectable 4 milliGRAM(s) IV Push every 8 hours PRN Nausea and/or Vomiting  oxyCODONE    5 mG/acetaminophen 325 mG 1 Tablet(s) Oral every 6 hours PRN Moderate Pain (4 - 6)    ASSESSMENT AND PLAN:  88y female PMH as above a/w     *recurrent falls, rib pain due to nondisplaced fractures ant/lat left 8-9th ribs w/ small amt edema/hemorrhage adjacent chest wall  - denies syncope  - CT head and Cspine unremarkable   - Trauma surgery eval appreciated  - Pain control  - Physical therapy    *age indeterminant moderate compression fracture in the inferior endplate T9  - due to falls, no back pain     *CKD3  -stable.    *Status post cholecystectomy with severe intrahepatic and extrahepatic   biliary ductal dilatation  - on imaging.    -LFTs normal  -asymptomatic.    *htn  -d/w daughter, home meds completed  -resume clonidine, losartan  -increase norvasc to home dose 10mg daily    *Depression:  -resume sertraline.    *Hypothyoid:  -resume synthroid    *dvt px  - heparin

## 2018-07-24 NOTE — PROGRESS NOTE ADULT - ASSESSMENT
A/P:  Left 8-9th rib fractures  Age indeterminant moderate compression fracture in the inferior endplate T9  Frequent falls  Medical comorbidities of htn, CKD3, OA, vertigo  Severe intra and extrahepatic biliary ductal dilation  Advise GI consultation/evaluation  Medical management per primary service  GI/DVT prophylaxis  Pain control  Incentive spirometry  F/U labs, radiologic studies  No acute trauma surgical intervention required   Pt aware of and agrees with all of the above

## 2018-07-24 NOTE — PROGRESS NOTE ADULT - SUBJECTIVE AND OBJECTIVE BOX
CC:Patient is a 88y old  Female who presents with a chief complaint of left rib pain after fall (22 Jul 2018 07:43)      Subjective:  Pt seen and examined at bedside with chaperone. Pt is AAOx3, pt in no acute distress. Pt states c/o nausea, emesis, left rib pain; pt denied c/o fever, chills, SOB, abd pain, extremity pain or dysfunction, hemoptysis, hematemesis, hematuria, hematochexia, headache, diplopia, vertigo, dizzyness. Pt states (+) void, (+) oob, (+) bowel function    ROS:  nausea, emesis, left rib pain, otherwise negative ROS      Vital Signs Last 24 Hrs  T(C): 36.8 (24 Jul 2018 03:35), Max: 37 (23 Jul 2018 23:54)  T(F): 98.3 (24 Jul 2018 03:35), Max: 98.6 (23 Jul 2018 23:54)  HR: 105 (24 Jul 2018 03:35) (102 - 110)  BP: 149/57 (24 Jul 2018 03:35) (123/65 - 172/83)  BP(mean): --  RR: 18 (24 Jul 2018 03:35) (16 - 18)  SpO2: 95% (24 Jul 2018 03:35) (94% - 97%)    Labs:                                11.3   7.93  )-----------( 232      ( 24 Jul 2018 09:56 )             35.3     CBC Full  -  ( 24 Jul 2018 09:56 )  WBC Count : 7.93 K/uL  Hemoglobin : 11.3 g/dL  Hematocrit : 35.3 %  Platelet Count - Automated : 232 K/uL  Mean Cell Volume : 92.9 fl  Mean Cell Hemoglobin : 29.7 pg  Mean Cell Hemoglobin Concentration : 32.0 gm/dL  Auto Neutrophil # : 5.76 K/uL  Auto Lymphocyte # : 1.39 K/uL  Auto Monocyte # : 0.52 K/uL  Auto Eosinophil # : 0.21 K/uL  Auto Basophil # : 0.04 K/uL  Auto Neutrophil % : 72.7 %  Auto Lymphocyte % : 17.5 %  Auto Monocyte % : 6.6 %  Auto Eosinophil % : 2.6 %  Auto Basophil % : 0.5 %    07-24    145  |  111<H>  |  31<H>  ----------------------------<  139<H>  4.2   |  24  |  1.68<H>    Ca    9.0      24 Jul 2018 09:56  Phos  3.8     07-24  Mg     1.6     07-24              Meds:  acetaminophen   Tablet 650 milliGRAM(s) Oral every 6 hours PRN  amLODIPine   Tablet 5 milliGRAM(s) Oral daily  hydrALAZINE Injectable 10 milliGRAM(s) IV Push three times a day PRN  lidocaine   Patch 1 Patch Transdermal daily  morphine  - Injectable 1 milliGRAM(s) IV Push every 6 hours PRN  ondansetron Injectable 4 milliGRAM(s) IV Push every 8 hours PRN  oxyCODONE    5 mG/acetaminophen 325 mG 1 Tablet(s) Oral every 6 hours PRN  pantoprazole    Tablet 40 milliGRAM(s) Oral before breakfast  sodium chloride 0.9%. 1000 milliLiter(s) IV Continuous <Continuous>      Radiology:  Pending cxr 7/24/18    < from: Xray Chest 1 View- PORTABLE-Routine (07.23.18 @ 14:43) >  EXAM:  XR CHEST PORTABLE ROUTINE 1V                            PROCEDURE DATE:  07/23/2018          INTERPRETATION:  History: Rib fractures    Portable radiograph of the chest is performed and compared to 7/22/2018.    The heart is not enlarged. The trachea is midline. There is*chronic   calcification of aorta and mitral annular calcification. There is no   focal infiltrate or pleural effusion. Right upper quadrant abdominal   cholecystectomy clips are seen. There is osteopenia of the bony   structures. There is no pneumothorax.    Impression: No active pulmonary disease                MIKE PALACIOS M.D.,ATTENDING RADIOLOGIST  This document has been electronically signed. Jul 23 2018  2:45PM    < end of copied text >      Physical exam:  GCS of 15  Pt is aaox3  Pt in no acute distress  Airway is patent  Breathing is symmetric and unlabored  CN II-XII grossly intact  HEENT: normocephalic, HUBERT, EOM wnl, no gross craniofacial bony patholgy to exam  Neck: No tracheal deviation, no JVD, no crepitus, no ecchymosis, no hematoma  Chest: No gross right rib or sternal pathology or tenderness to exam, (+) tenderness to left 8-9th lateral ribs from known fracture pathology, no crepitus, no ecchymosis, no hematoma  Resp: (+) b/l wheezing  CVS: S1S2(+)  ABD: bowel sounds (+), soft, nontender, non distended, no rebound, no guarding, no rigidity, no pelvic instability to exam  EXT: no calf tenderness or edema to exam b/l, pt has good capillary refill in all digits. Sensoromotor function grossly intact, on VTE prophylaxis  Skin: no adverse skin changes to exam

## 2018-07-25 DIAGNOSIS — S22.42XA MULTIPLE FRACTURES OF RIBS, LEFT SIDE, INITIAL ENCOUNTER FOR CLOSED FRACTURE: ICD-10-CM

## 2018-07-25 DIAGNOSIS — R29.6 REPEATED FALLS: ICD-10-CM

## 2018-07-25 LAB
ANION GAP SERPL CALC-SCNC: 10 MMOL/L — SIGNIFICANT CHANGE UP (ref 5–17)
BASOPHILS # BLD AUTO: 0.02 K/UL — SIGNIFICANT CHANGE UP (ref 0–0.2)
BASOPHILS NFR BLD AUTO: 0.3 % — SIGNIFICANT CHANGE UP (ref 0–2)
BUN SERPL-MCNC: 39 MG/DL — HIGH (ref 7–23)
CALCIUM SERPL-MCNC: 8.7 MG/DL — SIGNIFICANT CHANGE UP (ref 8.5–10.1)
CHLORIDE SERPL-SCNC: 112 MMOL/L — HIGH (ref 96–108)
CO2 SERPL-SCNC: 23 MMOL/L — SIGNIFICANT CHANGE UP (ref 22–31)
CREAT SERPL-MCNC: 1.72 MG/DL — HIGH (ref 0.5–1.3)
EOSINOPHIL # BLD AUTO: 0.27 K/UL — SIGNIFICANT CHANGE UP (ref 0–0.5)
EOSINOPHIL NFR BLD AUTO: 4 % — SIGNIFICANT CHANGE UP (ref 0–6)
GLUCOSE SERPL-MCNC: 97 MG/DL — SIGNIFICANT CHANGE UP (ref 70–99)
HCT VFR BLD CALC: 30.4 % — LOW (ref 34.5–45)
HCT VFR BLD CALC: 31 % — LOW (ref 34.5–45)
HGB BLD-MCNC: 10 G/DL — LOW (ref 11.5–15.5)
HGB BLD-MCNC: 10 G/DL — LOW (ref 11.5–15.5)
IMM GRANULOCYTES NFR BLD AUTO: 0.3 % — SIGNIFICANT CHANGE UP (ref 0–1.5)
LYMPHOCYTES # BLD AUTO: 1.16 K/UL — SIGNIFICANT CHANGE UP (ref 1–3.3)
LYMPHOCYTES # BLD AUTO: 17.1 % — SIGNIFICANT CHANGE UP (ref 13–44)
MAGNESIUM SERPL-MCNC: 1.6 MG/DL — SIGNIFICANT CHANGE UP (ref 1.6–2.6)
MCHC RBC-ENTMCNC: 31.2 PG — SIGNIFICANT CHANGE UP (ref 27–34)
MCHC RBC-ENTMCNC: 32.9 GM/DL — SIGNIFICANT CHANGE UP (ref 32–36)
MCV RBC AUTO: 94.7 FL — SIGNIFICANT CHANGE UP (ref 80–100)
MONOCYTES # BLD AUTO: 0.44 K/UL — SIGNIFICANT CHANGE UP (ref 0–0.9)
MONOCYTES NFR BLD AUTO: 6.5 % — SIGNIFICANT CHANGE UP (ref 2–14)
NEUTROPHILS # BLD AUTO: 4.88 K/UL — SIGNIFICANT CHANGE UP (ref 1.8–7.4)
NEUTROPHILS NFR BLD AUTO: 71.8 % — SIGNIFICANT CHANGE UP (ref 43–77)
NRBC # BLD: 0 /100 WBCS — SIGNIFICANT CHANGE UP (ref 0–0)
PHOSPHATE SERPL-MCNC: 3.7 MG/DL — SIGNIFICANT CHANGE UP (ref 2.5–4.5)
PLATELET # BLD AUTO: 180 K/UL — SIGNIFICANT CHANGE UP (ref 150–400)
POTASSIUM SERPL-MCNC: 3.5 MMOL/L — SIGNIFICANT CHANGE UP (ref 3.5–5.3)
POTASSIUM SERPL-SCNC: 3.5 MMOL/L — SIGNIFICANT CHANGE UP (ref 3.5–5.3)
RBC # BLD: 3.21 M/UL — LOW (ref 3.8–5.2)
RBC # FLD: 13.2 % — SIGNIFICANT CHANGE UP (ref 10.3–14.5)
SODIUM SERPL-SCNC: 145 MMOL/L — SIGNIFICANT CHANGE UP (ref 135–145)
WBC # BLD: 6.79 K/UL — SIGNIFICANT CHANGE UP (ref 3.8–10.5)
WBC # FLD AUTO: 6.79 K/UL — SIGNIFICANT CHANGE UP (ref 3.8–10.5)

## 2018-07-25 RX ORDER — SODIUM CHLORIDE 9 MG/ML
1000 INJECTION INTRAMUSCULAR; INTRAVENOUS; SUBCUTANEOUS
Qty: 0 | Refills: 0 | Status: DISCONTINUED | OUTPATIENT
Start: 2018-07-25 | End: 2018-07-28

## 2018-07-25 RX ADMIN — LOSARTAN POTASSIUM 50 MILLIGRAM(S): 100 TABLET, FILM COATED ORAL at 05:24

## 2018-07-25 RX ADMIN — LIDOCAINE 1 PATCH: 4 CREAM TOPICAL at 11:01

## 2018-07-25 RX ADMIN — Medication 2000 UNIT(S): at 11:00

## 2018-07-25 RX ADMIN — SERTRALINE 50 MILLIGRAM(S): 25 TABLET, FILM COATED ORAL at 11:01

## 2018-07-25 RX ADMIN — LIDOCAINE 1 PATCH: 4 CREAM TOPICAL at 01:58

## 2018-07-25 RX ADMIN — LIDOCAINE 1 PATCH: 4 CREAM TOPICAL at 05:29

## 2018-07-25 RX ADMIN — LIDOCAINE 1 PATCH: 4 CREAM TOPICAL at 23:06

## 2018-07-25 RX ADMIN — OXYCODONE AND ACETAMINOPHEN 1 TABLET(S): 5; 325 TABLET ORAL at 12:21

## 2018-07-25 RX ADMIN — SODIUM CHLORIDE 75 MILLILITER(S): 9 INJECTION INTRAMUSCULAR; INTRAVENOUS; SUBCUTANEOUS at 09:03

## 2018-07-25 RX ADMIN — PANTOPRAZOLE SODIUM 40 MILLIGRAM(S): 20 TABLET, DELAYED RELEASE ORAL at 05:26

## 2018-07-25 RX ADMIN — OXYCODONE AND ACETAMINOPHEN 1 TABLET(S): 5; 325 TABLET ORAL at 05:25

## 2018-07-25 RX ADMIN — AMLODIPINE BESYLATE 10 MILLIGRAM(S): 2.5 TABLET ORAL at 05:24

## 2018-07-25 RX ADMIN — Medication 50 MICROGRAM(S): at 05:25

## 2018-07-25 RX ADMIN — OXYCODONE AND ACETAMINOPHEN 1 TABLET(S): 5; 325 TABLET ORAL at 13:20

## 2018-07-25 RX ADMIN — OXYCODONE AND ACETAMINOPHEN 1 TABLET(S): 5; 325 TABLET ORAL at 07:00

## 2018-07-25 RX ADMIN — SODIUM CHLORIDE 75 MILLILITER(S): 9 INJECTION INTRAMUSCULAR; INTRAVENOUS; SUBCUTANEOUS at 18:32

## 2018-07-25 RX ADMIN — Medication 0.2 MILLIGRAM(S): at 05:25

## 2018-07-25 NOTE — PROGRESS NOTE ADULT - SUBJECTIVE AND OBJECTIVE BOX
88y old  Female who presents with a chief complaint of left rib pain after fall (22 Jul 2018 07:43)    Subjective:  Pt is AAOx3, pt in no acute distress. Pt states c/o nausea, emesis, left rib pain; pt denied c/o fever, chills, SOB, abd pain, extremity pain or dysfunction, hemoptysis, hematemesis, hematuria, hematochezia headache, diplopia, vertigo, dizziness Pt states (+) void, (+) oob, (+) bowel function.  7/25 No acute issues. Resting comfortably at the time of exam.    Vital Signs Last 24 Hrs  T(C): 36.4 (25 Jul 2018 17:12), Max: 36.6 (25 Jul 2018 05:18)  T(F): 97.5 (25 Jul 2018 17:12), Max: 97.8 (25 Jul 2018 05:18)  HR: 74 (25 Jul 2018 17:12) (69 - 81)  BP: 118/56 (25 Jul 2018 17:12) (103/45 - 136/53)  BP(mean): --  RR: 18 (25 Jul 2018 17:12) (16 - 18)  SpO2: 96% (25 Jul 2018 17:12) (94% - 96%)                                10.0   x     )-----------( x        ( 25 Jul 2018 12:08 )             31.0     CBC Full  -  ( 25 Jul 2018 12:08 )  WBC Count : x  Hemoglobin : 10.0 g/dL  Hematocrit : 31.0 %  Platelet Count - Automated : x  Mean Cell Volume : x  Mean Cell Hemoglobin : x  Mean Cell Hemoglobin Concentration : x  Auto Neutrophil # : x  Auto Lymphocyte # : x  Auto Monocyte # : x  Auto Eosinophil # : x  Auto Basophil # : x  Auto Neutrophil % : x  Auto Lymphocyte % : x  Auto Monocyte % : x  Auto Eosinophil % : x  Auto Basophil % : x    07-25    145  |  112<H>  |  39<H>  ----------------------------<  97  3.5   |  23  |  1.72<H>    Ca    8.7      25 Jul 2018 05:51  Phos  3.7     07-25  Mg     1.6     07-25

## 2018-07-25 NOTE — PROGRESS NOTE ADULT - SUBJECTIVE AND OBJECTIVE BOX
c/c: left rib pain after fall.      HPI:  88y female w/ PMH htn, CKD3, OA, vertigo presents w/ left rib pain due to fall.  Pt last seen here in April in ED w/ fall and d/c home.  Pt states she is clumsy and falls a lot. Denies loc, no dizziness, cp, sob, palp.  Lives w/ daughter.  She was admitted with Fractures of ant/lat left 8-9th ribs w/ small amt edema/hemorrhage adjacent chest wall and age indeterminant T9 vertebral fracture. Hospital course complicated by episodes of vomiting with report of coffee ground emesis X 1.     7/25: pt seen and examined. H/h lower today. Denies abd pain. tolerating po. No further vomiting.    Review of system- All 10 systems reviewed and is as per HPI otherwise negative.   Vital Signs Last 24 Hrs  T(C): 36.6 (25 Jul 2018 05:18), Max: 36.8 (24 Jul 2018 12:09)  T(F): 97.8 (25 Jul 2018 05:18), Max: 98.2 (24 Jul 2018 12:09)  HR: 81 (25 Jul 2018 05:18) (81 - 102)  BP: 136/53 (25 Jul 2018 05:18) (136/53 - 155/59)  RR: 16 (25 Jul 2018 05:18) (16 - 17)  SpO2: 96% (25 Jul 2018 05:18) (94% - 96%)  PHYSICAL EXAM:    GENERAL: elderly female, laying in bed, comfortable, NAD  HEAD:  Atraumatic, Normocephalic  EYES: EOMI, PERRLA  HEENT: Moist mucous membranes  NECK: Supple, No JVD  NERVOUS SYSTEM: Non focal.  CHEST/LUNG: decrease bs b/l, occasional rhonchi  HEART: S1, S2+, Regular rate and rhythm.  ABDOMEN: Soft, Nontender, Nondistended; Bowel sounds present  GENITOURINARY- Voiding, no palpable bladder  EXTREMITIES:  No clubbing, cyanosis, or edema  MUSCULOSKELETAL- left chest wall tenderness.  SKIN-no rash  LABS:                        10.0   6.79  )-----------( 180      ( 25 Jul 2018 05:51 )             30.4     07-25    145  |  112<H>  |  39<H>  ----------------------------<  97  3.5   |  23  |  1.72<H>    Ca    8.7      25 Jul 2018 05:51  Phos  3.7     07-25  Mg     1.6     07-25      MEDICATIONS  (STANDING):  amLODIPine   Tablet 5 milliGRAM(s) Oral daily  lidocaine   Patch 1 Patch Transdermal daily  pantoprazole    Tablet 40 milliGRAM(s) Oral before breakfast    MEDICATIONS  (PRN):  acetaminophen   Tablet 650 milliGRAM(s) Oral every 6 hours PRN pain or temp > 100.4  hydrALAZINE Injectable 10 milliGRAM(s) IV Push three times a day PRN sbp > 160  morphine  - Injectable 1 milliGRAM(s) IV Push every 6 hours PRN Severe Pain (7 - 10)  ondansetron Injectable 4 milliGRAM(s) IV Push every 8 hours PRN Nausea and/or Vomiting  oxyCODONE    5 mG/acetaminophen 325 mG 1 Tablet(s) Oral every 6 hours PRN Moderate Pain (4 - 6)    ASSESSMENT AND PLAN:  88y female PMH as above a/w     *recurrent falls, rib pain due to nondisplaced fractures ant/lat left 8-9th ribs w/ small amt edema/hemorrhage adjacent chest wall  - denies syncope  - CT head and Cspine unremarkable   - Trauma surgery eval appreciated  - Pain control  - Physical therapy    *Anemia:  -coffee ground emesis X 1 on 7/23, h/h at that time was stable  -on ppi  -check h/h @ noon  -check stool guiac.  -GI eval    *age indeterminant moderate compression fracture in the inferior endplate T9  - due to falls, no back pain     *CKD3  -ivf for worsening creatinine.    *Status post cholecystectomy with severe intrahepatic and extrahepatic   biliary ductal dilatation  - on imaging.    -LFTs normal  -asymptomatic.    *htn   clonidine, losartan, norvasc      *Depression:  -sertraline    *Hypothyoid:  -synthroid    *dvt px  - heparin on hold for drop in h/h, possible bleeding

## 2018-07-26 LAB
ANION GAP SERPL CALC-SCNC: 10 MMOL/L — SIGNIFICANT CHANGE UP (ref 5–17)
BASOPHILS # BLD AUTO: 0.02 K/UL — SIGNIFICANT CHANGE UP (ref 0–0.2)
BASOPHILS NFR BLD AUTO: 0.3 % — SIGNIFICANT CHANGE UP (ref 0–2)
BUN SERPL-MCNC: 46 MG/DL — HIGH (ref 7–23)
CALCIUM SERPL-MCNC: 8.8 MG/DL — SIGNIFICANT CHANGE UP (ref 8.5–10.1)
CHLORIDE SERPL-SCNC: 114 MMOL/L — HIGH (ref 96–108)
CO2 SERPL-SCNC: 19 MMOL/L — LOW (ref 22–31)
CREAT SERPL-MCNC: 1.99 MG/DL — HIGH (ref 0.5–1.3)
EOSINOPHIL # BLD AUTO: 0.31 K/UL — SIGNIFICANT CHANGE UP (ref 0–0.5)
EOSINOPHIL NFR BLD AUTO: 4.9 % — SIGNIFICANT CHANGE UP (ref 0–6)
GLUCOSE SERPL-MCNC: 79 MG/DL — SIGNIFICANT CHANGE UP (ref 70–99)
HCT VFR BLD CALC: 28.5 % — LOW (ref 34.5–45)
HCT VFR BLD CALC: 29.8 % — LOW (ref 34.5–45)
HGB BLD-MCNC: 9.2 G/DL — LOW (ref 11.5–15.5)
HGB BLD-MCNC: 9.7 G/DL — LOW (ref 11.5–15.5)
IMM GRANULOCYTES NFR BLD AUTO: 0.5 % — SIGNIFICANT CHANGE UP (ref 0–1.5)
LYMPHOCYTES # BLD AUTO: 0.81 K/UL — LOW (ref 1–3.3)
LYMPHOCYTES # BLD AUTO: 12.8 % — LOW (ref 13–44)
MAGNESIUM SERPL-MCNC: 1.7 MG/DL — SIGNIFICANT CHANGE UP (ref 1.6–2.6)
MCHC RBC-ENTMCNC: 30.9 PG — SIGNIFICANT CHANGE UP (ref 27–34)
MCHC RBC-ENTMCNC: 32.6 GM/DL — SIGNIFICANT CHANGE UP (ref 32–36)
MCV RBC AUTO: 94.9 FL — SIGNIFICANT CHANGE UP (ref 80–100)
MONOCYTES # BLD AUTO: 0.47 K/UL — SIGNIFICANT CHANGE UP (ref 0–0.9)
MONOCYTES NFR BLD AUTO: 7.4 % — SIGNIFICANT CHANGE UP (ref 2–14)
NEUTROPHILS # BLD AUTO: 4.7 K/UL — SIGNIFICANT CHANGE UP (ref 1.8–7.4)
NEUTROPHILS NFR BLD AUTO: 74.1 % — SIGNIFICANT CHANGE UP (ref 43–77)
NRBC # BLD: 0 /100 WBCS — SIGNIFICANT CHANGE UP (ref 0–0)
PHOSPHATE SERPL-MCNC: 3.8 MG/DL — SIGNIFICANT CHANGE UP (ref 2.5–4.5)
PLATELET # BLD AUTO: 147 K/UL — LOW (ref 150–400)
POTASSIUM SERPL-MCNC: 3.7 MMOL/L — SIGNIFICANT CHANGE UP (ref 3.5–5.3)
POTASSIUM SERPL-SCNC: 3.7 MMOL/L — SIGNIFICANT CHANGE UP (ref 3.5–5.3)
RBC # BLD: 3.14 M/UL — LOW (ref 3.8–5.2)
RBC # FLD: 13.1 % — SIGNIFICANT CHANGE UP (ref 10.3–14.5)
SODIUM SERPL-SCNC: 143 MMOL/L — SIGNIFICANT CHANGE UP (ref 135–145)
WBC # BLD: 6.34 K/UL — SIGNIFICANT CHANGE UP (ref 3.8–10.5)
WBC # FLD AUTO: 6.34 K/UL — SIGNIFICANT CHANGE UP (ref 3.8–10.5)

## 2018-07-26 PROCEDURE — 99232 SBSQ HOSP IP/OBS MODERATE 35: CPT

## 2018-07-26 PROCEDURE — 74176 CT ABD & PELVIS W/O CONTRAST: CPT | Mod: 26

## 2018-07-26 RX ADMIN — Medication 0.2 MILLIGRAM(S): at 18:08

## 2018-07-26 RX ADMIN — Medication 50 MICROGRAM(S): at 05:57

## 2018-07-26 RX ADMIN — AMLODIPINE BESYLATE 10 MILLIGRAM(S): 2.5 TABLET ORAL at 05:57

## 2018-07-26 RX ADMIN — LOSARTAN POTASSIUM 50 MILLIGRAM(S): 100 TABLET, FILM COATED ORAL at 05:57

## 2018-07-26 RX ADMIN — SERTRALINE 50 MILLIGRAM(S): 25 TABLET, FILM COATED ORAL at 13:16

## 2018-07-26 RX ADMIN — Medication 2000 UNIT(S): at 13:16

## 2018-07-26 RX ADMIN — Medication 0.2 MILLIGRAM(S): at 05:58

## 2018-07-26 RX ADMIN — PANTOPRAZOLE SODIUM 40 MILLIGRAM(S): 20 TABLET, DELAYED RELEASE ORAL at 05:57

## 2018-07-26 RX ADMIN — LIDOCAINE 1 PATCH: 4 CREAM TOPICAL at 13:17

## 2018-07-26 NOTE — PROGRESS NOTE ADULT - SUBJECTIVE AND OBJECTIVE BOX
88y old  Female who presents with a chief complaint of left rib pain after fall (22 Jul 2018 07:43)    Subjective:  Pt is AAOx3, pt in no acute distress. Pt states c/o nausea, emesis, left rib pain; pt denied c/o fever, chills, SOB, abd pain, extremity pain or dysfunction, hemoptysis, hematemesis, hematuria, hematochezia headache, diplopia, vertigo, dizziness Pt states (+) void, (+) oob, (+) bowel function.  7/25 No acute issues. Resting comfortably at the time of exam.  7/26 GI noted, can be worked up outpatient. No other issues, tolerating diet.    ROS- negative other than above.    Vital Signs Last 24 Hrs  T(C): 37 (26 Jul 2018 05:11), Max: 37 (26 Jul 2018 05:11)  T(F): 98.6 (26 Jul 2018 05:11), Max: 98.6 (26 Jul 2018 05:11)  HR: 84 (26 Jul 2018 05:11) (69 - 84)  BP: 128/51 (26 Jul 2018 05:11) (103/45 - 128/51)  BP(mean): --  RR: 18 (26 Jul 2018 05:11) (18 - 18)  SpO2: 92% (26 Jul 2018 05:11) (90% - 96%)                                9.7    6.34  )-----------( 147      ( 26 Jul 2018 05:39 )             29.8     CBC Full  -  ( 26 Jul 2018 05:39 )  WBC Count : 6.34 K/uL  Hemoglobin : 9.7 g/dL  Hematocrit : 29.8 %  Platelet Count - Automated : 147 K/uL  Mean Cell Volume : 94.9 fl  Mean Cell Hemoglobin : 30.9 pg  Mean Cell Hemoglobin Concentration : 32.6 gm/dL  Auto Neutrophil # : 4.70 K/uL  Auto Lymphocyte # : 0.81 K/uL  Auto Monocyte # : 0.47 K/uL  Auto Eosinophil # : 0.31 K/uL  Auto Basophil # : 0.02 K/uL  Auto Neutrophil % : 74.1 %  Auto Lymphocyte % : 12.8 %  Auto Monocyte % : 7.4 %  Auto Eosinophil % : 4.9 %  Auto Basophil % : 0.3 %    07-26    143  |  114<H>  |  46<H>  ----------------------------<  79  3.7   |  19<L>  |  1.99<H>    Ca    8.8      26 Jul 2018 05:39  Phos  3.8     07-26  Mg     1.7     07-26

## 2018-07-26 NOTE — PROGRESS NOTE ADULT - SUBJECTIVE AND OBJECTIVE BOX
c/c: left rib pain after fall.      HPI:  88y female w/ PMH htn, CKD3, OA, vertigo presents w/ left rib pain due to fall.  Pt last seen here in April in ED w/ fall and d/c home.  Pt states she is clumsy and falls a lot. Denies loc, no dizziness, cp, sob, palp.  Lives w/ daughter.  She was admitted with Fractures of ant/lat left 8-9th ribs w/ small amt edema/hemorrhage adjacent chest wall and age indeterminant T9 vertebral fracture. Hospital course complicated by episodes of vomiting with report of coffee ground emesis X 1. Subsequently, anemia/renal function worsening.     7/26- pt seen and examined earlier today. Felt ok. Tolerating po. No abd pain. Still with left chest wall pain.    Review of system- All 10 systems reviewed and is as per HPI otherwise negative.     Vital Signs Last 24 Hrs  T(C): 36.9 (26 Jul 2018 11:15), Max: 37 (26 Jul 2018 05:11)  T(F): 98.4 (26 Jul 2018 11:15), Max: 98.6 (26 Jul 2018 05:11)  HR: 82 (26 Jul 2018 11:15) (74 - 84)  BP: 125/55 (26 Jul 2018 11:15) (118/56 - 128/51)  RR: 18 (26 Jul 2018 11:15) (18 - 18)  SpO2: 92% (26 Jul 2018 11:15) (90% - 96%)PHYSICAL EXAM:    GENERAL: elderly female,sitting in chair, comfortable, NAD  HEAD:  Atraumatic, Normocephalic  EYES: EOMI, PERRLA, conjunctival pallor  HEENT: Moist mucous membranes  NECK: Supple, No JVD  NERVOUS SYSTEM: Non focal.  CHEST/LUNG: decrease bs b/l, occasional rhonchi  HEART: S1, S2+, Regular rate and rhythm.  ABDOMEN: Soft, Nontender, Nondistended; Bowel sounds present  GENITOURINARY- Voiding, no palpable bladder  EXTREMITIES:  No clubbing, cyanosis, or edema  MUSCULOSKELETAL- left chest wall tenderness.  SKIN-no rash    LABS:                        9.2    x     )-----------( x        ( 26 Jul 2018 11:38 )             28.5     07-26    143  |  114<H>  |  46<H>  ----------------------------<  79  3.7   |  19<L>  |  1.99<H>    Ca    8.8      26 Jul 2018 05:39  Phos  3.8     07-26  Mg     1.7     07-26    MEDICATIONS  (STANDING):  amLODIPine   Tablet 5 milliGRAM(s) Oral daily  lidocaine   Patch 1 Patch Transdermal daily  pantoprazole    Tablet 40 milliGRAM(s) Oral before breakfast    MEDICATIONS  (PRN):  acetaminophen   Tablet 650 milliGRAM(s) Oral every 6 hours PRN pain or temp > 100.4  hydrALAZINE Injectable 10 milliGRAM(s) IV Push three times a day PRN sbp > 160  morphine  - Injectable 1 milliGRAM(s) IV Push every 6 hours PRN Severe Pain (7 - 10)  ondansetron Injectable 4 milliGRAM(s) IV Push every 8 hours PRN Nausea and/or Vomiting  oxyCODONE    5 mG/acetaminophen 325 mG 1 Tablet(s) Oral every 6 hours PRN Moderate Pain (4 - 6)    ASSESSMENT AND PLAN:  88y female PMH as above a/w     *recurrent falls, rib pain due to nondisplaced fractures ant/lat left 8-9th ribs w/ small amt edema/hemorrhage adjacent chest wall  - denies syncope  - CT head and Cspine unremarkable   - Trauma surgery eval appreciated  - Pain control  - Physical therapy    *Anemia:  -coffee ground emesis X 1 on 7/23, h/h at that time was stable  -on ppi  -h/h lower subsequently  -seen by GI, no further overt GI bleeding.  -on ppi  -f/u stool guiac  -check Ct a/p r/o rp bleed      *age indeterminant moderate compression fracture in the inferior endplate T9  - due to falls, no back pain     *CKD3  -ivf for worsening creatinine.    *Status post cholecystectomy with severe intrahepatic and extrahepatic   biliary ductal dilatation  - on imaging.    -LFTs normal  -asymptomatic.    *htn   clonidine, losartan, norvasc      *Depression:  -sertraline    *Hypothyoid:  -synthroid    *dvt px  - heparin on hold for drop in h/h, possible bleeding c/c: left rib pain after fall.      HPI:  88y female w/ PMH htn, CKD3, OA, vertigo presents w/ left rib pain due to fall.  Pt last seen here in April in ED w/ fall and d/c home.  Pt states she is clumsy and falls a lot. Denies loc, no dizziness, cp, sob, palp.  Lives w/ daughter.  She was admitted with Fractures of ant/lat left 8-9th ribs w/ small amt edema/hemorrhage adjacent chest wall and age indeterminant T9 vertebral fracture.  Hospital course complicated by episodes of vomiting with report of coffee ground emesis X 1.  Subsequently, anemia/renal function worsening.     7/26- pt seen and examined earlier today. Felt ok. Tolerating po. No abd pain. Still with left chest wall pain.    Review of system- All 10 systems reviewed and is as per HPI otherwise negative.     Vital Signs Last 24 Hrs  T(C): 36.9 (26 Jul 2018 11:15), Max: 37 (26 Jul 2018 05:11)  T(F): 98.4 (26 Jul 2018 11:15), Max: 98.6 (26 Jul 2018 05:11)  HR: 82 (26 Jul 2018 11:15) (74 - 84)  BP: 125/55 (26 Jul 2018 11:15) (118/56 - 128/51)  RR: 18 (26 Jul 2018 11:15) (18 - 18)  SpO2: 92% (26 Jul 2018 11:15) (90% - 96%)PHYSICAL EXAM:    GENERAL: elderly female,sitting in chair, comfortable, NAD  HEAD:  Atraumatic, Normocephalic  EYES: EOMI, PERRLA, conjunctival pallor  HEENT: Moist mucous membranes  NECK: Supple, No JVD  NERVOUS SYSTEM: Non focal.  CHEST/LUNG: decrease bs b/l, occasional rhonchi  HEART: S1, S2+, Regular rate and rhythm.  ABDOMEN: Soft, Nontender, Nondistended; Bowel sounds present  GENITOURINARY- Voiding, no palpable bladder  EXTREMITIES:  No clubbing, cyanosis, or edema  MUSCULOSKELETAL- left chest wall tenderness.  SKIN-no rash    LABS:                        9.2    x     )-----------( x        ( 26 Jul 2018 11:38 )             28.5     07-26    143  |  114<H>  |  46<H>  ----------------------------<  79  3.7   |  19<L>  |  1.99<H>    Ca    8.8      26 Jul 2018 05:39  Phos  3.8     07-26  Mg     1.7     07-26    MEDICATIONS  (STANDING):  amLODIPine   Tablet 10 milliGRAM(s) Oral daily  cholecalciferol 2000 Unit(s) Oral daily  cloNIDine 0.2 milliGRAM(s) Oral two times a day  levothyroxine 50 MICROGram(s) Oral daily  lidocaine   Patch 1 Patch Transdermal daily  losartan 50 milliGRAM(s) Oral daily  pantoprazole    Tablet 40 milliGRAM(s) Oral before breakfast  sertraline 50 milliGRAM(s) Oral daily  sodium chloride 0.9%. 1000 milliLiter(s) (75 mL/Hr) IV Continuous <Continuous>    MEDICATIONS  (PRN):  acetaminophen   Tablet 650 milliGRAM(s) Oral every 6 hours PRN pain or temp > 100.4  morphine  - Injectable 1 milliGRAM(s) IV Push every 6 hours PRN Severe Pain (7 - 10)  ondansetron Injectable 4 milliGRAM(s) IV Push every 8 hours PRN Nausea and/or Vomiting  oxyCODONE    5 mG/acetaminophen 325 mG 1 Tablet(s) Oral every 6 hours PRN Moderate Pain (4 - 6)  ASSESSMENT AND PLAN:  88y female PMH as above a/w     *recurrent falls, rib pain due to nondisplaced fractures ant/lat left 8-9th ribs w/ small amt edema/hemorrhage adjacent chest wall  - denies syncope  - CT head and Cspine unremarkable   - Trauma surgery eval appreciated  - Pain control  - Physical therapy    *Anemia:  -coffee ground emesis X 1 on 7/23, h/h at that time was stable  -on ppi  -h/h lower subsequently  -seen by GI, no further overt GI bleeding.  -on ppi  -f/u stool guiac  -check Ct a/p r/o rp bleed      *age indeterminant moderate compression fracture in the inferior endplate T9  - due to falls, no back pain     *Acute on CKD III:  -IVF  -hold losartan  -consult nephrology    *Status post cholecystectomy with severe intrahepatic and extrahepatic   biliary ductal dilatation  - on imaging.    -LFTs normal  -asymptomatic.    *htn   clonidine, norvasc  -hold losartan.      *Depression:  -sertraline    *Hypothyoid:  -synthroid    *dvt px  - heparin on hold for drop in h/h, possible bleeding

## 2018-07-26 NOTE — CONSULT NOTE ADULT - ASSESSMENT
87 yo woman with long term hx of HTN.  Although pt unaware, likely has a degree of CKD.    MELISSA related to recent IV contrast and possibly decreased volume due to recent hematemesis and compromised po intake.  Agree with holding ARB until renal recovery.  Noted MRCP was initially ordered to assess Intra and extrahepatic bile duct dilatation but now cancelled.  Would need to hold off on any contrast studies until some renal recovery.  Check renal USG to assess for size and echogenicity.  Continue gentle hydration.
Imp:  Hematemesis resolved  Dilated bile ducts -- serious pathology unlikely with normal LFTs    Rec:  MRCP  Would not pursue endoscopy unless hematemesis recurs  Cont protonix
A/P:  Left 8-9th rib fractures  Age indeterminant moderate compression fracture in the inferior endplate T9  Frequent falls  Medical comorbidities of htn, CKD3, OA, vertigo  Severe intra and extrahepatic biliary ductal dilation  Advise GI consultation/evaluation  Medical management per primary service  GI/DVT prophylaxis  Pain control  Incentive spirometry  F/U labs, radiologic studies  No acute trauma surgical intervention is required   Pt aware of and agrees with all of the above

## 2018-07-26 NOTE — PROGRESS NOTE ADULT - PROBLEM SELECTOR PROBLEM 1
Closed fracture of multiple ribs of left side, initial encounter
Closed fracture of multiple ribs of left side, initial encounter

## 2018-07-26 NOTE — CONSULT NOTE ADULT - SUBJECTIVE AND OBJECTIVE BOX
Chief complaints.  Pt admitted after a fall at home.    HPI:  87 yo woman with hx of HTN and unknown hx of CKD admitted post fall at home sustaining Left Rib Fx.  Pt also had previous ED visit for fall at home in April.  Pt's creat was around 1.4. Hospital course has been complicated by an episode of Hematemesis. GI symptoms now stable and is eating better.   Renal evaluation requested for worsening renal function since admission.    CT of chest and abdomen done late 7/22 with IV contrast reveals atrophic right kidney.	      PMHX and PSHX.  1.HTN  2,Hx of Vertigo  3.Left Hip and Knee replacements.    FAMILY HISTORY:  No pertinent family history in first degree relatives      SOCIAL HISTORY :  No hx of smoking or ETOH.  Lives at home with her daughter.  Allergies    oxycodone (Other (Moderate))  OxyContin (Unknown)    REVIEW OF SYSTEMS:  Denies SOB  Denies chest discomfort  Reports fair appetite  Denies nausea         MEDICATIONS  (STANDING):  amLODIPine   Tablet 10 milliGRAM(s) Oral daily  cholecalciferol 2000 Unit(s) Oral daily  cloNIDine 0.2 milliGRAM(s) Oral two times a day  levothyroxine 50 MICROGram(s) Oral daily  lidocaine   Patch 1 Patch Transdermal daily  pantoprazole    Tablet 40 milliGRAM(s) Oral before breakfast  sertraline 50 milliGRAM(s) Oral daily  sodium chloride 0.9%. 1000 milliLiter(s) (75 mL/Hr) IV Continuous <Continuous>    MEDICATIONS  (PRN):  acetaminophen   Tablet 650 milliGRAM(s) Oral every 6 hours PRN pain or temp > 100.4  morphine  - Injectable 1 milliGRAM(s) IV Push every 6 hours PRN Severe Pain (7 - 10)  ondansetron Injectable 4 milliGRAM(s) IV Push every 8 hours PRN Nausea and/or Vomiting  oxyCODONE    5 mG/acetaminophen 325 mG 1 Tablet(s) Oral every 6 hours PRN Moderate Pain (4 - 6)         Vital Signs Last 24 Hrs  T(C): 36.9 (26 Jul 2018 11:15), Max: 37 (26 Jul 2018 05:11)  T(F): 98.4 (26 Jul 2018 11:15), Max: 98.6 (26 Jul 2018 05:11)  HR: 82 (26 Jul 2018 11:15) (74 - 84)  BP: 125/55 (26 Jul 2018 11:15) (118/56 - 128/51)  BP(mean): --  RR: 18 (26 Jul 2018 11:15) (18 - 18)  SpO2: 92% (26 Jul 2018 11:15) (90% - 96%)  Daily     Daily   I&O's Summary      PHYSICAL EXAM:  Alert and comfortable  GEN: no distress  HEENT: WNL  NECK : supple  CV: S1S2 RRR  LUNGS: scattered rhonchi  ABD: soft  EXT: trace edema    LABS:                        9.2    x     )-----------( x        ( 26 Jul 2018 11:38 )             28.5     07-26    143  |  114<H>  |  46<H>  ----------------------------<  79  3.7   |  19<L>  |  1.99<H>    Ca    8.8      26 Jul 2018 05:39  Phos  3.8     07-26  Mg     1.7     07-26          Magnesium, Serum: 1.7 mg/dL (07-26 @ 05:39)  Phosphorus Level, Serum: 3.8 mg/dL (07-26 @ 05:39)      < from: CT Abdomen and Pelvis w/ IV Cont (07.22.18 @ 01:56) >    EXAM:  CT ABDOMEN AND PELVIS IC                          EXAM:  CT CHEST IC                            PROCEDURE DATE:  07/22/2018          INTERPRETATION:  CLINICAL INFORMATION: Status post multiple falls in the   past few days.  Patient complainsof sharp pain in the left lower lobes,   left axilla and left flank that is worse with movement, breathing or   speaking.  TECHNIQUE:   Axial CT images were acquired arterial phase and through the abdomen and   pelvis during the portal venous phase.  Intravenous contrast:    COMPARISON STUDIES:None    FINDINGS:   CHEST:  Lungs: Bibasilar subsegmental atelectasis.  Pleura: No pleural effusion or pneumothorax.  Airways: Central airways are clear.    Heart: Cardiomegaly with left greater than right heart enlargement.  No   pericardial effusion.  Scattered atherosclerotic calcifications of the   coronary arteries.  Dense dystrophic calcification of the mitral annulus.  Mediastinum/Maine: Nonspecific right lower paratracheal lymph node   measuring 1 cm short axis.    Vasculature: Scattered atherosclerotic calcifications of the thoracic   aorta and branch vessels.      Chest wall and lower neck: There nondisplaced fractures in the   anterolateral aspect of the left eighth rib (601:65 and 603:120-122) and   there is anterolateral aspect of the left ninth rib (3:36 and   603:120-122).  Mild soft tissue prominence in the adjacent anterior left   lower chest wall probably reflects hemorrhage/edema.  (3:42-44).  Bones: Age indeterminant moderate compression fracture in the inferior   endplate of T9.    ABDOMEN/PELVIS:  Liver: Within normal limits.  Bile Ducts: Marked intrahepatic and extrahepatic or ductal dilatation.    Common bile duct is dilated to approximately 2 cm (601:52).  Gallbladder: Status post cholecystectomy.  Spleen: Within normal limits.  Pancreas: Within normal limits.  Adrenal glands: Within normal limits.  Kidneys/ureters: Right kidney is atrophic with renal cortical thinning.    Kidneys enhance symmetrically without hydronephrosis.  Multiple   subcentimeter low-attenuation foci in both kidneys, too small to   characterize by CT scan.    Bladder: Within normal limits.  Reproductive organs: No uterine or adnexal mass.      Bowel: Small to moderate hiatal hernia.  No bowel obstruction.  Appendix   not visualized; no secondary signs of appendicitis  Diverticulosis of the   distal descending and proximal sigmoid colon.  Peritoneum: No hemoperitoneum, ascites or free air.    Vasculature: Atherosclerotic calcificationsof the aortoiliac tree.    Retroperitoneum: No hematoma.  No lymphadenopathy.      Abdominal wall/soft tissues: Small fat-containing umbilical hernia.  Bones: Thoracolumbar scoliosis and multilevel degenerative changes in the   spine.  Chronic fracture deformities in the right superior and inferior   pubic rami.  Right hip osteoarthrosis and chondrocalcinosis.  Status post   left hip arthroplasty.  Vacuum phenomenon sacroiliac joints.    Chondrocalcinosis in the pubic symphysis.      IMPRESSION:  Chest:  1.  Nondisplaced fractures in the anterolateral aspects of the left   eighth and ninth ribs with small amount of edema/hemorrhage in the   adjacent chest wall.    2.  Age indeterminant moderate compression fracture in the inferior   endplate of T9.    Abdomen/pelvis:  1.  No evidence of acute traumatic injury abdomen or pelvis.   2.  Status post cholecystectomy with severe intrahepatic and extrahepatic   biliary ductal dilatation.  Correlate with liver function tests.                ROBERT YOO   This document has been electronically signed. Jul 22 2018  2:31AM        < end of copied text >
HPI:  88y female w/ PMH htn, CKD3, OA, vertigo presents w/ left rib pain due to fall.  Pt last seen here in April in ED w/ fall and d/c home.  Pt states she is clumsy and falls a lot. Denies loc, no dizziness, cp, sob, palp.  Lives w/ daughter.  Pt states she can't recall her bp meds but her daughter will bring.  Complains of 10/10 left side rib pain.  Requesting pain meds.  Then wants to go home.     ------------------------------  had hematemesis 2 days ago, none since. No N/V now. However, CT also showed dilated bile ducts.    PMH: htn, ckd3  PSH:  left hip and knee replacement (22 Jul 2018 07:43)      PAST MEDICAL & SURGICAL HISTORY:  No significant past surgical history        MEDICATIONS  (STANDING):  amLODIPine   Tablet 10 milliGRAM(s) Oral daily  cholecalciferol 2000 Unit(s) Oral daily  cloNIDine 0.2 milliGRAM(s) Oral two times a day  levothyroxine 50 MICROGram(s) Oral daily  lidocaine   Patch 1 Patch Transdermal daily  losartan 50 milliGRAM(s) Oral daily  pantoprazole    Tablet 40 milliGRAM(s) Oral before breakfast  sertraline 50 milliGRAM(s) Oral daily  sodium chloride 0.9%. 1000 milliLiter(s) (75 mL/Hr) IV Continuous <Continuous>    MEDICATIONS  (PRN):  acetaminophen   Tablet 650 milliGRAM(s) Oral every 6 hours PRN pain or temp > 100.4  morphine  - Injectable 1 milliGRAM(s) IV Push every 6 hours PRN Severe Pain (7 - 10)  ondansetron Injectable 4 milliGRAM(s) IV Push every 8 hours PRN Nausea and/or Vomiting  oxyCODONE    5 mG/acetaminophen 325 mG 1 Tablet(s) Oral every 6 hours PRN Moderate Pain (4 - 6)      Allergies    oxycodone (Other (Moderate))  OxyContin (Unknown)    Intolerances        SOCIAL HISTORY:    FAMILY HISTORY:  No pertinent family history in first degree relatives      ROS  As above  Otherwise unremarkable    Vital Signs Last 24 Hrs  T(C): 37 (26 Jul 2018 05:11), Max: 37 (26 Jul 2018 05:11)  T(F): 98.6 (26 Jul 2018 05:11), Max: 98.6 (26 Jul 2018 05:11)  HR: 84 (26 Jul 2018 05:11) (69 - 84)  BP: 128/51 (26 Jul 2018 05:11) (103/45 - 128/51)  BP(mean): --  RR: 18 (26 Jul 2018 05:11) (18 - 18)  SpO2: 92% (26 Jul 2018 05:11) (90% - 96%)    Constitutional: NAD, well-developed  Respiratory: CTAB  Cardiovascular: S1 and S2, RRR  Gastrointestinal: BS+, soft, NT/ND  Extremities: No peripheral edema  Psychiatric: Normal mood, normal affect  Skin: No rashes    LABS:                        9.7    6.34  )-----------( 147      ( 26 Jul 2018 05:39 )             29.8     07-25    145  |  112<H>  |  39<H>  ----------------------------<  97  3.5   |  23  |  1.72<H>    Ca    8.7      25 Jul 2018 05:51  Phos  3.7     07-25  Mg     1.6     07-25            RADIOLOGY & ADDITIONAL STUDIES:
CC:Patient is a 88y old  Female who presents with a chief complaint of left rib pain after fall (22 Jul 2018 07:43)      Subjective:  Pt seen and examined at bedside with chaperone. Pt is AAOx3, pt in no acute distress. Pt states c/o nausea, emesis, left rib pain; pt denied c/o fever, chills, SOB, abd pain, extremity pain or dysfunction, hemoptysis, hematemesis, hematuria, hematochexia, headache, diplopia, vertigo, dizzyness. Pt states (+) void, (+) oob, (+) bowel function    ROS:  nausea, emesis, left rib pain, otherwise negative ROS    PMH: htn, CKD3, OA, vertigo  PSH: left hip and knee replacement, cholecystectomy  Allergies: oxycodone, OxyContin (Unknown)  SH: denied etoh, tobacco, illicit drug use  FH: No pertinent family history in first degree relatives.    Vital Signs Last 24 Hrs  T(C): 36.3 (23 Jul 2018 11:49), Max: 37.1 (22 Jul 2018 22:25)  T(F): 97.3 (23 Jul 2018 11:49), Max: 98.7 (22 Jul 2018 22:25)  HR: 108 (23 Jul 2018 11:49) (80 - 113)  BP: 123/65 (23 Jul 2018 11:49) (123/65 - 207/85)  BP(mean): --  RR: 17 (23 Jul 2018 11:49) (16 - 17)  SpO2: 94% (23 Jul 2018 11:49) (92% - 95%)    Labs:      CARDIAC MARKERS ( 22 Jul 2018 00:33 )  <0.015 ng/mL / x     / 96 U/L / x     / x                                12.2   x     )-----------( x        ( 23 Jul 2018 13:43 )             37.1     CBC Full  -  ( 23 Jul 2018 13:43 )  WBC Count : x  Hemoglobin : 12.2 g/dL  Hematocrit : 37.1 %  Platelet Count - Automated : x  Mean Cell Volume : x  Mean Cell Hemoglobin : x  Mean Cell Hemoglobin Concentration : x  Auto Neutrophil # : x  Auto Lymphocyte # : x  Auto Monocyte # : x  Auto Eosinophil # : x  Auto Basophil # : x  Auto Neutrophil % : x  Auto Lymphocyte % : x  Auto Monocyte % : x  Auto Eosinophil % : x  Auto Basophil % : x    07-23    142  |  111<H>  |  27<H>  ----------------------------<  100<H>  3.8   |  20<L>  |  1.38<H>    Ca    9.3      23 Jul 2018 05:50  Mg     1.6     07-22    TPro  8.4<H>  /  Alb  4.1  /  TBili  0.3  /  DBili  x   /  AST  21  /  ALT  24  /  AlkPhos  77  07-22    LIVER FUNCTIONS - ( 22 Jul 2018 00:33 )  Alb: 4.1 g/dL / Pro: 8.4 gm/dL / ALK PHOS: 77 U/L / ALT: 24 U/L / AST: 21 U/L / GGT: x           PT/INR - ( 22 Jul 2018 00:33 )   PT: 10.6 sec;   INR: 0.98 ratio         PTT - ( 22 Jul 2018 00:33 )  PTT:28.3 sec      Meds:  acetaminophen   Tablet 650 milliGRAM(s) Oral every 6 hours PRN  amLODIPine   Tablet 5 milliGRAM(s) Oral daily  hydrALAZINE Injectable 10 milliGRAM(s) IV Push three times a day PRN  lidocaine   Patch 1 Patch Transdermal daily  morphine  - Injectable 1 milliGRAM(s) IV Push every 6 hours PRN  ondansetron Injectable 4 milliGRAM(s) IV Push every 8 hours PRN  oxyCODONE    5 mG/acetaminophen 325 mG 1 Tablet(s) Oral every 6 hours PRN      Radiology:  < from: CT Abdomen and Pelvis w/ IV Cont (07.22.18 @ 01:56) >  EXAM:  CT ABDOMEN AND PELVIS IC                          EXAM:  CT CHEST IC                            PROCEDURE DATE:  07/22/2018          INTERPRETATION:  CLINICAL INFORMATION: Status post multiple falls in the   past few days.  Patient complainsof sharp pain in the left lower lobes,   left axilla and left flank that is worse with movement, breathing or   speaking.  TECHNIQUE:   Axial CT images were acquired arterial phase and through the abdomen and   pelvis during the portal venous phase.  Intravenous contrast:    COMPARISON STUDIES:None    FINDINGS:   CHEST:  Lungs: Bibasilar subsegmental atelectasis.  Pleura: No pleural effusion or pneumothorax.  Airways: Central airways are clear.    Heart: Cardiomegaly with left greater than right heart enlargement.  No   pericardial effusion.  Scattered atherosclerotic calcifications of the   coronary arteries.  Dense dystrophic calcification of the mitral annulus.  Mediastinum/Maine: Nonspecific right lower paratracheal lymph node   measuring 1 cm short axis.    Vasculature: Scattered atherosclerotic calcifications of the thoracic   aorta and branch vessels.      Chest wall and lower neck: There nondisplaced fractures in the   anterolateral aspect of the left eighth rib (601:65 and 603:120-122) and   there is anterolateral aspect of the left ninth rib (3:36 and   603:120-122).  Mild soft tissue prominence in the adjacent anterior left   lower chest wall probably reflects hemorrhage/edema.  (3:42-44).  Bones: Age indeterminant moderate compression fracture in the inferior   endplate of T9.    ABDOMEN/PELVIS:  Liver: Within normal limits.  Bile Ducts: Marked intrahepatic and extrahepatic or ductal dilatation.    Common bile duct is dilated to approximately 2 cm (601:52).  Gallbladder: Status post cholecystectomy.  Spleen: Within normal limits.  Pancreas: Within normal limits.  Adrenal glands: Within normal limits.  Kidneys/ureters: Right kidney is atrophic with renal cortical thinning.    Kidneys enhance symmetrically without hydronephrosis.  Multiple   subcentimeter low-attenuation foci in both kidneys, too small to   characterize by CT scan.    Bladder: Within normal limits.  Reproductive organs: No uterine or adnexal mass.      Bowel: Small to moderate hiatal hernia.  No bowel obstruction.  Appendix   not visualized; no secondary signs of appendicitis  Diverticulosis of the   distal descending and proximal sigmoid colon.  Peritoneum: No hemoperitoneum, ascites or free air.    Vasculature: Atherosclerotic calcificationsof the aortoiliac tree.    Retroperitoneum: No hematoma.  No lymphadenopathy.      Abdominal wall/soft tissues: Small fat-containing umbilical hernia.  Bones: Thoracolumbar scoliosis and multilevel degenerative changes in the   spine.  Chronic fracture deformities in the right superior and inferior   pubic rami.  Right hip osteoarthrosis and chondrocalcinosis.  Status post   left hip arthroplasty.  Vacuum phenomenon sacroiliac joints.    Chondrocalcinosis in the pubic symphysis.      IMPRESSION:  Chest:  1.  Nondisplaced fractures in the anterolateral aspects of the left   eighth and ninth ribs with small amount of edema/hemorrhage in the   adjacent chest wall.    2.  Age indeterminant moderate compression fracture in the inferior   endplate of T9.    Abdomen/pelvis:  1.  No evidence of acute traumatic injury abdomen or pelvis.   2.  Status post cholecystectomy with severe intrahepatic and extrahepatic   biliary ductal dilatation.  Correlate with liver function tests.                ROBERT YOO   This document has been electronically signed. Jul 22 2018  2:31AM        < end of copied text >  < from: Xray Chest 1 View- PORTABLE-Routine (07.23.18 @ 14:43) >  EXAM:  XR CHEST PORTABLE ROUTINE 1V                            PROCEDURE DATE:  07/23/2018          INTERPRETATION:  History: Rib fractures    Portable radiograph of the chest is performed and compared to 7/22/2018.    The heart is not enlarged. The trachea is midline. There is*chronic   calcification of aorta and mitral annular calcification. There is no   focal infiltrate or pleural effusion. Right upper quadrant abdominal   cholecystectomy clips are seen. There is osteopenia of the bony   structures. There is no pneumothorax.    Impression: No active pulmonary disease                MIKE PALACIOS M.D.,ATTENDING RADIOLOGIST  This document has been electronically signed. Jul 23 2018  2:45PM        < end of copied text >  < from: CT Cervical Spine No Cont (07.22.18 @ 01:52) >  EXAM:  CT CERVICAL SPINE                            PROCEDURE DATE:  07/22/2018          INTERPRETATION:  CLINICAL INFORMATION: Trauma. Fall.    TECHNIQUE:    Axial CT images were acquired through the head and cervical spine.  Intravenous contrast:None  Two-dimensional reformats were generated.    COMPARISON STUDY: None    FINDINGS:   CT head:    There is no CT evidence of acute intracranial hemorrhage, mass effect,   midline shift, or acute territorial infarct.    The ventricles and sulci   are normal in size and configuration. The basal cisterns are patent.    There are periventricular white matter hypodensities that are nonspecific   in nature but may reflect chronic ischemic microvascular disease.      The visualized paranasal sinuses are clear.    Partial opacification of the caudal-most right-sided mastoiditis.        The calvarium, skull base and extracranial soft tissues are grossly   intact.      CT cervical spine:    There is preservation of the cervical lordosis.  There is no CT evidence of an acute cervical spine fracture or traumatic   malalignment.  There is no suspicious lytic or blastic lesion.  The paraspinous soft tissues are unremarkable within limits of CT scan.    Degenerative changes:  No clinically significant degenerative changes, spinal canal stenosis or   neuroforaminal narrowing is visualized by CT technique.    Incidental findings:  Visualized soft tissues of the neck are unremarkable.  Visualized lung apices are unremarkable.      IMPRESSION:     Noevidence of acute intracranial hemorrhage, midline shift or CT   evidence of acute territorial infarct.    If the patient's symptoms persist, consider short interval follow-up head   CT or brain MRI if there are no MRI contraindications.        No acute cervical fracture or traumatic malalignment.    MRI would be required to evaluate the ligamentous structures at higher   sensitivity as well as for better evaluation of the cervical canal and   its contents.                      AISHWARYA COSTA   This document has been electronically signed. Jul 22 2018  2:08AM    < end of copied text >      Physical exam:  GCS of 15  Airway is patent  Breathing is symmetric and unlabored  CNII-XII grossly intact  HEENT: Normocephalic, atraumatic, HUBERT, EOM wnl, no otorrhea or hemotympanum b/l, no epistaxis or d/c b/l nares, no craniofacial bony pathology or tenderness b/l  Neck: Soft and supple, nontender. No crepitus, no ecchymosis, no hematoma, to exam, no JVD, no tracheal deviation  Cspine/thoracolumbrosacral spine: no gross bony pathology or tenderness to exam  Cardiovascular: S1S2 Present  Chest: no gross right rib pathology or tenderness to exam. No sternal pathology or tenderness to exam. (+) tenderness to left 8-9th ribs laterally from known fracture pathology. No crepitus, no ecchymosis, no gross hematoma. No penetrating thorcoabdominal trauma  Respiratory: Rate is 18; Respiratory Effort normal; no wheezes, rales or rhonchi to exam  ABD: bowel sounds (+), soft, nontender, non distended, no rebound, no gaurding, no rigidity, no skin changes to exam. No pelvic instability to exam, no skin changes  Genitourinary: perineal/perirectal hematoma/ecchymosis/tenderness to exam  Musculoskeletal: Pt has palpable b/l radial, femoral, dorsalis pedis pulses. All digits are warm and well perfused. No gross long bone pathology or tenderness to exam. Pt demonstrates grossly intact sensoromotor function. Pt has good capillary refill to digits, no calf edema or tenderness to exam.  Skin: no adverse lesions or rashes to exam

## 2018-07-27 ENCOUNTER — TRANSCRIPTION ENCOUNTER (OUTPATIENT)
Age: 83
End: 2018-07-27

## 2018-07-27 LAB
ANION GAP SERPL CALC-SCNC: 10 MMOL/L — SIGNIFICANT CHANGE UP (ref 5–17)
BASOPHILS # BLD AUTO: 0.02 K/UL — SIGNIFICANT CHANGE UP (ref 0–0.2)
BASOPHILS NFR BLD AUTO: 0.3 % — SIGNIFICANT CHANGE UP (ref 0–2)
BILIRUB DIRECT SERPL-MCNC: 0.1 MG/DL — SIGNIFICANT CHANGE UP (ref 0–0.2)
BILIRUB INDIRECT FLD-MCNC: 0.3 MG/DL — SIGNIFICANT CHANGE UP (ref 0.2–1)
BILIRUB SERPL-MCNC: 0.4 MG/DL — SIGNIFICANT CHANGE UP (ref 0.2–1.2)
BUN SERPL-MCNC: 41 MG/DL — HIGH (ref 7–23)
CALCIUM SERPL-MCNC: 8.2 MG/DL — LOW (ref 8.5–10.1)
CHLORIDE SERPL-SCNC: 116 MMOL/L — HIGH (ref 96–108)
CO2 SERPL-SCNC: 19 MMOL/L — LOW (ref 22–31)
CREAT SERPL-MCNC: 1.67 MG/DL — HIGH (ref 0.5–1.3)
DIR ANTIGLOB POLYSPECIFIC INTERPRETATION: SIGNIFICANT CHANGE UP
EOSINOPHIL # BLD AUTO: 0.23 K/UL — SIGNIFICANT CHANGE UP (ref 0–0.5)
EOSINOPHIL NFR BLD AUTO: 3.8 % — SIGNIFICANT CHANGE UP (ref 0–6)
FERRITIN SERPL-MCNC: 200 NG/ML — HIGH (ref 15–150)
GLUCOSE SERPL-MCNC: 94 MG/DL — SIGNIFICANT CHANGE UP (ref 70–99)
HAPTOGLOB SERPL-MCNC: 212 MG/DL — HIGH (ref 34–200)
HCT VFR BLD CALC: 26.6 % — LOW (ref 34.5–45)
HCT VFR BLD CALC: 27.5 % — LOW (ref 34.5–45)
HCT VFR BLD CALC: 33.6 % — LOW (ref 34.5–45)
HGB BLD-MCNC: 10.6 G/DL — LOW (ref 11.5–15.5)
HGB BLD-MCNC: 9 G/DL — LOW (ref 11.5–15.5)
HGB BLD-MCNC: 9 G/DL — LOW (ref 11.5–15.5)
IMM GRANULOCYTES NFR BLD AUTO: 0.5 % — SIGNIFICANT CHANGE UP (ref 0–1.5)
IRON SATN MFR SERPL: 22 UG/DL — LOW (ref 30–160)
IRON SATN MFR SERPL: 8 % — LOW (ref 14–50)
LDH SERPL L TO P-CCNC: 189 U/L — SIGNIFICANT CHANGE UP (ref 84–241)
LYMPHOCYTES # BLD AUTO: 0.85 K/UL — LOW (ref 1–3.3)
LYMPHOCYTES # BLD AUTO: 14.1 % — SIGNIFICANT CHANGE UP (ref 13–44)
MAGNESIUM SERPL-MCNC: 1.4 MG/DL — LOW (ref 1.6–2.6)
MCHC RBC-ENTMCNC: 30.9 PG — SIGNIFICANT CHANGE UP (ref 27–34)
MCHC RBC-ENTMCNC: 32.7 GM/DL — SIGNIFICANT CHANGE UP (ref 32–36)
MCV RBC AUTO: 94.5 FL — SIGNIFICANT CHANGE UP (ref 80–100)
MONOCYTES # BLD AUTO: 0.49 K/UL — SIGNIFICANT CHANGE UP (ref 0–0.9)
MONOCYTES NFR BLD AUTO: 8.1 % — SIGNIFICANT CHANGE UP (ref 2–14)
NEUTROPHILS # BLD AUTO: 4.4 K/UL — SIGNIFICANT CHANGE UP (ref 1.8–7.4)
NEUTROPHILS NFR BLD AUTO: 73.2 % — SIGNIFICANT CHANGE UP (ref 43–77)
NRBC # BLD: 0 /100 WBCS — SIGNIFICANT CHANGE UP (ref 0–0)
PHOSPHATE SERPL-MCNC: 3 MG/DL — SIGNIFICANT CHANGE UP (ref 2.5–4.5)
PLATELET # BLD AUTO: 149 K/UL — LOW (ref 150–400)
POTASSIUM SERPL-MCNC: 3.4 MMOL/L — LOW (ref 3.5–5.3)
POTASSIUM SERPL-SCNC: 3.4 MMOL/L — LOW (ref 3.5–5.3)
RBC # BLD: 2.91 M/UL — LOW (ref 3.8–5.2)
RBC # BLD: 3.14 M/UL — LOW (ref 3.8–5.2)
RBC # FLD: 13.1 % — SIGNIFICANT CHANGE UP (ref 10.3–14.5)
RETICS #: 48 K/UL — SIGNIFICANT CHANGE UP (ref 25–125)
RETICS/RBC NFR: 1.5 % — SIGNIFICANT CHANGE UP (ref 0.5–2.5)
SODIUM SERPL-SCNC: 145 MMOL/L — SIGNIFICANT CHANGE UP (ref 135–145)
TIBC SERPL-MCNC: 276 UG/DL — SIGNIFICANT CHANGE UP (ref 220–430)
UIBC SERPL-MCNC: 254 UG/DL — SIGNIFICANT CHANGE UP (ref 110–370)
WBC # BLD: 6.02 K/UL — SIGNIFICANT CHANGE UP (ref 3.8–10.5)
WBC # FLD AUTO: 6.02 K/UL — SIGNIFICANT CHANGE UP (ref 3.8–10.5)

## 2018-07-27 PROCEDURE — 76770 US EXAM ABDO BACK WALL COMP: CPT | Mod: 26

## 2018-07-27 RX ORDER — LOSARTAN POTASSIUM 100 MG/1
1 TABLET, FILM COATED ORAL
Qty: 0 | Refills: 0 | COMMUNITY

## 2018-07-27 RX ORDER — ACETAMINOPHEN 500 MG
2 TABLET ORAL
Qty: 0 | Refills: 0 | COMMUNITY
Start: 2018-07-27

## 2018-07-27 RX ORDER — LIDOCAINE 4 G/100G
1 CREAM TOPICAL
Qty: 0 | Refills: 0 | COMMUNITY
Start: 2018-07-27

## 2018-07-27 RX ADMIN — PANTOPRAZOLE SODIUM 40 MILLIGRAM(S): 20 TABLET, DELAYED RELEASE ORAL at 05:55

## 2018-07-27 RX ADMIN — SERTRALINE 50 MILLIGRAM(S): 25 TABLET, FILM COATED ORAL at 11:21

## 2018-07-27 RX ADMIN — Medication 50 MICROGRAM(S): at 05:55

## 2018-07-27 RX ADMIN — AMLODIPINE BESYLATE 10 MILLIGRAM(S): 2.5 TABLET ORAL at 05:55

## 2018-07-27 RX ADMIN — LIDOCAINE 1 PATCH: 4 CREAM TOPICAL at 11:20

## 2018-07-27 RX ADMIN — SODIUM CHLORIDE 50 MILLILITER(S): 9 INJECTION INTRAMUSCULAR; INTRAVENOUS; SUBCUTANEOUS at 17:41

## 2018-07-27 RX ADMIN — Medication 0.2 MILLIGRAM(S): at 17:41

## 2018-07-27 RX ADMIN — Medication 2000 UNIT(S): at 11:22

## 2018-07-27 RX ADMIN — LIDOCAINE 1 PATCH: 4 CREAM TOPICAL at 00:52

## 2018-07-27 RX ADMIN — Medication 0.2 MILLIGRAM(S): at 05:54

## 2018-07-27 RX ADMIN — LIDOCAINE 1 PATCH: 4 CREAM TOPICAL at 23:53

## 2018-07-27 NOTE — PROGRESS NOTE ADULT - SUBJECTIVE AND OBJECTIVE BOX
Patient is a 88y old  Female who presents with a chief complaint of left rib pain after fall (27 Jul 2018 15:08)      Subective:  No bleeding  H/H down but came back up  MRCP was canceled but unclear by who    PAST MEDICAL & SURGICAL HISTORY:  No significant past surgical history      MEDICATIONS  (STANDING):  amLODIPine   Tablet 10 milliGRAM(s) Oral daily  cholecalciferol 2000 Unit(s) Oral daily  cloNIDine 0.2 milliGRAM(s) Oral two times a day  levothyroxine 50 MICROGram(s) Oral daily  lidocaine   Patch 1 Patch Transdermal daily  pantoprazole    Tablet 40 milliGRAM(s) Oral before breakfast  sertraline 50 milliGRAM(s) Oral daily  sodium chloride 0.9%. 1000 milliLiter(s) (50 mL/Hr) IV Continuous <Continuous>    MEDICATIONS  (PRN):  acetaminophen   Tablet 650 milliGRAM(s) Oral every 6 hours PRN pain or temp > 100.4  morphine  - Injectable 1 milliGRAM(s) IV Push every 6 hours PRN Severe Pain (7 - 10)  ondansetron Injectable 4 milliGRAM(s) IV Push every 8 hours PRN Nausea and/or Vomiting  oxyCODONE    5 mG/acetaminophen 325 mG 1 Tablet(s) Oral every 6 hours PRN Moderate Pain (4 - 6)      REVIEW OF SYSTEMS:    RESPIRATORY: No shortness of breath  CARDIOVASCULAR: No chest pain  All other review of systems is negative unless indicated above.    Vital Signs Last 24 Hrs  T(C): 36.7 (27 Jul 2018 10:54), Max: 36.9 (27 Jul 2018 00:03)  T(F): 98.1 (27 Jul 2018 10:54), Max: 98.5 (27 Jul 2018 00:03)  HR: 77 (27 Jul 2018 10:54) (73 - 81)  BP: 146/54 (27 Jul 2018 10:54) (124/40 - 154/52)  BP(mean): --  RR: 17 (27 Jul 2018 10:54) (17 - 18)  SpO2: 96% (27 Jul 2018 10:54) (96% - 96%)    PHYSICAL EXAM:    Constitutional: NAD, well-developed  Respiratory: CTAB  Cardiovascular: S1 and S2, RRR  Gastrointestinal: BS+, soft, NT/ND  Extremities: No peripheral edema  Psychiatric: Normal mood, normal affect    LABS:                        10.6   x     )-----------( x        ( 27 Jul 2018 13:21 )             33.6     07-27    145  |  116<H>  |  41<H>  ----------------------------<  94  3.4<L>   |  19<L>  |  1.67<H>    Ca    8.2<L>      27 Jul 2018 06:17  Phos  3.0     07-27  Mg     1.4     07-27    TPro  x   /  Alb  x   /  TBili  0.4  /  DBili  0.1  /  AST  x   /  ALT  x   /  AlkPhos  x   07-27          RADIOLOGY & ADDITIONAL STUDIES:

## 2018-07-27 NOTE — DISCHARGE NOTE ADULT - ADDITIONAL INSTRUCTIONS
1. Repeat cbc and basic metabolic panel in 3 days for follow up of anemia, renal function.   2. Follow up with GI if coffee ground emesis recurs. 1. Repeat cbc and basic metabolic panel in 3 days for follow up of anemia, renal function. if renal function stable, can restart losartan   2. Follow up with GI as outpt in 1-2 weeks

## 2018-07-27 NOTE — DISCHARGE NOTE ADULT - CARE PLAN
Principal Discharge DX:	Closed fracture of multiple ribs of left side, initial encounter  Goal:	healing  Assessment and plan of treatment:	take pain meds as prescribed, physical therapy at rehab.

## 2018-07-27 NOTE — DISCHARGE NOTE ADULT - MEDICATION SUMMARY - MEDICATIONS TO TAKE
I will START or STAY ON the medications listed below when I get home from the hospital:    aspirin 81 mg oral tablet  -- 1 tab(s) by mouth once a day  -- Indication: For home med    acetaminophen 325 mg oral tablet  -- 2 tab(s) by mouth every 6 hours, As needed, pain or temp > 100.4  -- Indication: For pain    oxyCODONE-acetaminophen 5 mg-325 mg oral tablet  -- 1 tab(s) by mouth every 6 hours, As needed, Moderate Pain (4 - 6)  -- Indication: For pain    cloNIDine 0.2 mg oral tablet  -- 1 tab(s) by mouth 2 times a day  -- Indication: For home med    sertraline 50 mg oral tablet  -- 1 tab(s) by mouth once a day  -- Indication: For home med    amLODIPine 10 mg oral tablet  -- 1 tab(s) by mouth once a day  -- Indication: For home med    lidocaine 5% topical film  -- Apply on skin to affected area once a day to left chest wall  -- Indication: For pain due to rib fractures    omeprazole 40 mg oral delayed release capsule  -- 1 cap(s) by mouth once a day  -- Indication: For home med    levothyroxine 50 mcg (0.05 mg) oral tablet  -- 1 tab(s) by mouth once a day  -- Indication: For home med    Vitamin D3 2000 intl units oral tablet  -- 1 tab(s) by mouth once a day  -- Indication: For home med I will START or STAY ON the medications listed below when I get home from the hospital:    aspirin 81 mg oral tablet  -- 1 tab(s) by mouth once a day  -- Indication: For home med    acetaminophen 325 mg oral tablet  -- 2 tab(s) by mouth every 6 hours, As needed, pain or temp > 100.4  -- Indication: For pain    oxyCODONE-acetaminophen 5 mg-325 mg oral tablet  -- 1 tab(s) by mouth every 6 hours, As needed, Moderate Pain (4 - 6)  -- Indication: For pain    cloNIDine 0.2 mg oral tablet  -- 1 tab(s) by mouth 2 times a day  -- Indication: For home med    sertraline 50 mg oral tablet  -- 1 tab(s) by mouth once a day  -- Indication: For home med    amLODIPine 10 mg oral tablet  -- 1 tab(s) by mouth once a day  -- Indication: For home med    lidocaine 5% topical film  -- Apply on skin to affected area once a day to left chest wall  -- Indication: For pain due to rib fractures    ferrous sulfate 325 mg (65 mg elemental iron) oral tablet  -- 1 tab(s) by mouth 2 times a day  -- Indication: For iron deficiency anemia    omeprazole 40 mg oral delayed release capsule  -- 1 cap(s) by mouth once a day  -- Indication: For home med    levothyroxine 50 mcg (0.05 mg) oral tablet  -- 1 tab(s) by mouth once a day  -- Indication: For home med    ascorbic acid 250 mg oral tablet  -- 1 tab(s) by mouth 2 times a day  -- Indication: For iron deficieny anemia, to enhance iron absorption    Vitamin D3 2000 intl units oral tablet  -- 1 tab(s) by mouth once a day  -- Indication: For home med

## 2018-07-27 NOTE — PROGRESS NOTE ADULT - SUBJECTIVE AND OBJECTIVE BOX
NEPHROLOGY INTERVAL HPI/OVERNIGHT EVENTS:    appears comfortable  tolerating po moderately      MEDICATIONS  (STANDING):  amLODIPine   Tablet 10 milliGRAM(s) Oral daily  cholecalciferol 2000 Unit(s) Oral daily  cloNIDine 0.2 milliGRAM(s) Oral two times a day  levothyroxine 50 MICROGram(s) Oral daily  lidocaine   Patch 1 Patch Transdermal daily  pantoprazole    Tablet 40 milliGRAM(s) Oral before breakfast  sertraline 50 milliGRAM(s) Oral daily  sodium chloride 0.9%. 1000 milliLiter(s) (75 mL/Hr) IV Continuous <Continuous>    MEDICATIONS  (PRN):  acetaminophen   Tablet 650 milliGRAM(s) Oral every 6 hours PRN pain or temp > 100.4  morphine  - Injectable 1 milliGRAM(s) IV Push every 6 hours PRN Severe Pain (7 - 10)  ondansetron Injectable 4 milliGRAM(s) IV Push every 8 hours PRN Nausea and/or Vomiting  oxyCODONE    5 mG/acetaminophen 325 mG 1 Tablet(s) Oral every 6 hours PRN Moderate Pain (4 - 6)      Allergies    oxycodone (Other (Moderate))  OxyContin (Unknown)    Intolerances        I&O's Detail    27 Jul 2018 07:01  -  27 Jul 2018 13:47  --------------------------------------------------------  IN:    Oral Fluid: 240 mL  Total IN: 240 mL    OUT:  Total OUT: 0 mL    Total NET: 240 mL        Vital Signs Last 24 Hrs  T(C): 36.7 (27 Jul 2018 10:54), Max: 36.9 (27 Jul 2018 00:03)  T(F): 98.1 (27 Jul 2018 10:54), Max: 98.5 (27 Jul 2018 00:03)  HR: 77 (27 Jul 2018 10:54) (73 - 81)  BP: 146/54 (27 Jul 2018 10:54) (124/40 - 154/52)  BP(mean): --  RR: 17 (27 Jul 2018 10:54) (17 - 18)  SpO2: 96% (27 Jul 2018 10:54) (96% - 96%)  Daily     Daily     PHYSICAL EXAM:  General: alert. awake   HEENT: MMM  CV: s1s2 rrr  LUNGS: B/L CTA  EXT: no edema    LABS:                        9.0    6.02  )-----------( 149      ( 27 Jul 2018 06:17 )             27.5     07-27    145  |  116<H>  |  41<H>  ----------------------------<  94  3.4<L>   |  19<L>  |  1.67<H>    Ca    8.2<L>      27 Jul 2018 06:17  Phos  3.0     07-27  Mg     1.4     07-27    TPro  x   /  Alb  x   /  TBili  0.4  /  DBili  0.1  /  AST  x   /  ALT  x   /  AlkPhos  x   07-27        Magnesium, Serum: 1.4 mg/dL (07-27 @ 06:17)  Phosphorus Level, Serum: 3.0 mg/dL (07-27 @ 06:17)    XAM:  US KIDNEYS AND BLADDER                            PROCEDURE DATE:  07/27/2018          INTERPRETATION:  Ultrasound of the kidneys/bladder         CLINICAL INFORMATION:   Acute kidney injury/chronic kidney injury.   Atrophic right kidney. Evaluate for renal obstruction. Patient unable to   void.    TECHNIQUE:  Transabdominal sonography was performed.    COMPARISON: Abdominal/pelvic CT dated 7/26/2018.    FINDINGS:       The right kidney measures 7.9 cm in length. Its contours are smooth.   There is mild cortical atrophy and increased echogenicity of the renal   cortex keeping with medical renal disease. There is an upper pole complex   cyst measuring 1.2 x 1.4 x 0.9 cm, demonstrating and internal focus of   echogenicity without flow, which likely represents debris/blood products.    No calculi are seen.  No hydronephrosis is present.     The left kidney measures 9.3 cm in length. Its contours are smooth. There   is mild cortical atrophy and increased echogenicity of the renal cortex   keeping with medical renal disease  No focal lesion is found.  No calculi   are seen.  No hydronephrosis is present.    The bladder is unremarkable in the distended state.  Bilateral ureteral   jets demonstrate ureteral patency.  No bladder mass or calculus is   recognized.  No wall thickening is recognized.    No free fluid is found in the pelvis.      The abdominal aorta measures less than 1.8 cm anterior to posterior   dimensions.  The IVC and retroperitoneal structures appear intact.      IMPRESSION:        No evidence for obstructive uropathy.    Mild bilateral renal parenchymal atrophy. Findings keeping with bilateral   medical renal disease.    Small superior pole right renal cyst, containing debris/blood products.      EXAM:  CT ABDOMEN AND PELVIS                            PROCEDURE DATE:  07/26/2018          INTERPRETATION:  Clinical information: Anemia. Evaluate for   retroperitoneal hemorrhage.    COMPARISON: No prior CT abdomen/pelvis examinations are available for   comparison. Relevant images from CT chest dated July 22, 2018 were   reviewed.    PROCEDURE:   CT of the abdomen and pelvis was performed.  IV contrast:  None  Oral contrast:  None  Coronal and sagittal reformatted images were obtained    FINDINGS:    LOWER CHEST: Mild atelectatic changes at the lung bases.    LIVER: Within normal limits.  SPLEEN: Moderate intra and extra hepatic biliary ductal dilatation. The   common duct measures 2.1 cm.  PANCREAS: Within normal limits.  GALLBLADDER: Cholecystectomy.  BILE DUCTS: Normal caliber.  ADRENALS: Within normal limits.  KIDNEYS/URETERS: No mass, stone or hydronephrosis. Indeterminate   hyperdense lesions in both kidneys, likely hemorrhagic cysts.    RETROPERITONEUM: No lymphadenopathy or hemorrhage.    VESSELS:  Severe atherosclerotic arterial calcification.  Normal caliber   aorta.    BOWEL: No bowel obstruction, wall thickening or inflammatory change.   Appendix not identified. Moderate hiatal hernia.  PERITONEUM: No ascites or pneumoperitoneum.    REPRODUCTIVE ORGANS: The uterus and adnexa are within normal limits.  BLADDER: Within normal limits.    ABDOMINAL WALL: Within normal limits.  BONES: No acute bony abnormality. Left hip arthroplasty. Old right   superior and inferior pubic rami fractures. Unchanged compression   deformity of T9.    IMPRESSION:   No retroperitoneal hemorrhage.    Moderate intra and extra hepatic biliary ductal dilatation, etiology not   visualized. MRCP for further evaluation as warranted.                    AUDREY ASCENCIO   This document has been electronically signed. Jul 26 2018  2:31PM      EXAM:  CT ABDOMEN AND PELVIS IC                          EXAM:  CT CHEST IC                            PROCEDURE DATE:  07/22/2018          INTERPRETATION:  CLINICAL INFORMATION: Status post multiple falls in the   past few days.  Patient complains of sharp pain in the left lower lobes,   left axilla and left flank that is worse with movement, breathing or   speaking.  TECHNIQUE:   Axial CT images were acquired arterial phase and through the abdomen and   pelvis during the portal venous phase.  Intravenous contrast:    COMPARISON STUDIES:None    FINDINGS:   CHEST:  Lungs: Bibasilar subsegmental atelectasis.  Pleura: No pleural effusion or pneumothorax.  Airways: Central airways are clear.    Heart: Cardiomegaly with left greater than right heart enlargement.  No   pericardial effusion.  Scattered atherosclerotic calcifications of the   coronary arteries.  Dense dystrophic calcification of the mitral annulus.  Mediastinum/[place]: Nonspecific right lower paratracheal lymph node   measuring 1 cm short axis.    Vasculature: Scattered atherosclerotic calcifications of the thoracic   aorta and branch vessels.      Chest wall and lower neck: There nondisplaced fractures in the   anterolateral aspect of the left eighth rib (601:65 and 603:120-122) and   there is anterolateral aspect of the left ninth rib (3:36 and   603:120-122).  Mild soft tissue prominence in the adjacent anterior left   lower chest wall probably reflects hemorrhage/edema.  (3:42-44).  Bones: Age indeterminant moderate compression fracture in the inferior   endplate of T9.    ABDOMEN/PELVIS:  Liver: Within normal limits.  Bile Ducts: Marked intrahepatic and extrahepatic or ductal dilatation.    Common bile duct is dilated to approximately 2 cm (601:52).  Gallbladder: Status post cholecystectomy.  Spleen: Within normal limits.  Pancreas: Within normal limits.  Adrenal glands: Within normal limits.  Kidneys/ureters: Right kidney is atrophic with renal cortical thinning.    Kidneys enhance symmetrically without hydronephrosis.  Multiple   subcentimeter low-attenuation foci in both kidneys, too small to   characterize by CT scan.    Bladder: Within normal limits.  Reproductive organs: No uterine or adnexal mass.      Bowel: Small to moderate hiatal hernia.  No bowel obstruction.  Appendix   not visualized; no secondary signs of appendicitis  Diverticulosis of the   distal descending and proximal sigmoid colon.  Peritoneum: No hemoperitoneum, ascites or free air.    Vasculature: Atherosclerotic calcifications of the aortoiliac tree.    Retroperitoneum: No hematoma.  No lymphadenopathy.      Abdominal wall/soft tissues: Small fat-containing umbilical hernia.  Bones: Thoracolumbar scoliosis and multilevel degenerative changes in the   spine.  Chronic fracture deformities in the right superior and inferior   pubic rami.  Right hip osteoarthrosis and chondrocalcinosis.  Status post   left hip arthroplasty.  Vacuum phenomenon sacroiliac joints.    Chondrocalcinosis in the pubic symphysis.      IMPRESSION:    Chest:  1.  Nondisplaced fractures in the anterolateral aspects of the left   eighth and ninth ribs with small amount of edema/hemorrhage in the   adjacent chest wall.    2.  Age indeterminant moderate compression fracture in the inferior   endplate of T9.    Abdomen/pelvis:  1.  No evidence of acute traumatic injury abdomen or pelvis.   2.  Status post cholecystectomy with severe intrahepatic and extrahepatic   biliary ductal dilatation.  Correlate with liver function tests.                ROBERT YOO   This document has been electronically signed. Jul 22 2018  2:31AM

## 2018-07-27 NOTE — DISCHARGE NOTE ADULT - HOSPITAL COURSE
88y female w/ PMH htn, CKD3, OA, vertigo presents w/ left rib pain due to fall.  Pt last seen here in April in ED w/ fall and d/c home.  Pt states she is clumsy and falls a lot. Denies loc, no dizziness, cp, sob, palp.  Lives w/ daughter.  She was admitted with Fractures of ant/lat left 8-9th ribs w/ small amt edema/hemorrhage adjacent chest wall and age indeterminant T9 vertebral fracture. She was seen by trauma service while here. She was noted to have Dilated CBD, however she is s/p cholecystectomy and lfts were wnl.  Hospital course complicated by episodes of vomiting with report of coffee ground emesis X 1.  Subsequently, anemia/renal function worsening. Was seen by GI no further vomiting occurred and EGD was deferred. Hemoglobin however was noted to slowly decrease. She underwent CT a/p which was negative for RP bleed. She had no melanotic or blood stools, no further vomiting that would indicate active gi bleeding. Hemolysis panel was negative. Today hgb has improved. It will be repeated again and if stable she will be discharged to rehab.   Regarding her renal function, she was seen by nephrology, and given ivf. her arb was held and her renal function is improving. she will be discharged off her arb for now. She should have her cbc and bmp monitored as outpt.     For physical exam please see progress note form 7/27/18    LABS:                        10.6   x     )-----------( x        ( 27 Jul 2018 13:21 )             33.6     07-27    145  |  116<H>  |  41<H>  ----------------------------<  94  3.4<L>   |  19<L>  |  1.67<H>    Ca    8.2<L>      27 Jul 2018 06:17  Phos  3.0     07-27  Mg     1.4     07-27    TPro  x   /  Alb  x   /  TBili  0.4  /  DBili  0.1  /  AST  x   /  ALT  x   /  AlkPhos  x   07-27    < from: CT Abdomen and Pelvis No Cont (07.26.18 @ 13:57) >  IMPRESSION:   No retroperitoneal hemorrhage.  Moderate intra and extra hepatic biliary ductal dilatation, etiology not   visualized. MRCP for further evaluation as warranted.      FINAL DIAGNOSIS:  *recurrent falls, rib pain due to nondisplaced fractures ant/lat left 8-9th ribs w/ small amt edema/hemorrhage adjacent chest wall  *Anemia-NO SOURCE OF BLEEDING FOUND, OUTPT F/U  *age indeterminant moderate compression fracture in the inferior endplate T9  *Acute on CKD III-Improving.  *Status post cholecystectomy with severe intrahepatic and extrahepatic   biliary ductal dilatation   *htn  *Depression  *Hypothyroid    time taken for dc 75 min  will notify pcp upon dc 88y female w/ PMH htn, CKD3, OA, vertigo presents w/ left rib pain due to fall.  Pt last seen here in April in ED w/ fall and d/c home.  Pt states she is clumsy and falls a lot. Denies loc, no dizziness, cp, sob, palp.  Lives w/ daughter.  She was admitted with Fractures of ant/lat left 8-9th ribs w/ small amt edema/hemorrhage adjacent chest wall and age indeterminant T9 vertebral fracture. She was seen by trauma service while here. She was noted to have Dilated CBD, however she is s/p cholecystectomy and lfts were wnl.  Hospital course complicated by episodes of vomiting with report of coffee ground emesis X 1.  Subsequently, anemia/renal function worsening. Was seen by GI no further vomiting occurred and EGD was deferred. Hemoglobin however was noted to slowly decrease. She underwent CT a/p which was negative for RP bleed. She had no melanotic or blood stools, no further vomiting that would indicate active gi bleeding. Hemolysis panel was negative. Regarding her renal function, she was seen by nephrology, and given ivf. her arb was held and her renal function improved.  she will be discharged off her arb for now. She should have her cbc and bmp monitored as outpt.     For physical exam please see progress note form 7/30/18  LABS:                        9.4    6.58  )-----------( 194      ( 30 Jul 2018 06:00 )             29.2     07-30    146<H>  |  118<H>  |  35<H>  ----------------------------<  94  3.7   |  18<L>  |  1.22    Ca    8.4<L>      30 Jul 2018 06:00  Phos  3.2     07-30  Mg     1.8     07-30    CT Abdomen and Pelvis No Cont (07.26.18 @ 13:57) >  IMPRESSION:   No retroperitoneal hemorrhage.  Moderate intra and extra hepatic biliary ductal dilatation, etiology not   visualized. MRCP for further evaluation as warranted.      FINAL DIAGNOSIS:  *recurrent falls, rib pain due to nondisplaced fractures ant/lat left 8-9th ribs w/ small amt edema/hemorrhage adjacent chest wall  *Iron deficiency Anemia/Possible GI bleed-outpt f/u  *age indeterminant moderate compression fracture in the inferior endplate T9  *Acute on CKD III-Improved  *Status post cholecystectomy with severe intrahepatic and extrahepatic   biliary ductal dilatation   *htn  *Depression  *Hypothyroid    time taken for dc 75 min  left message for pcp re: dc.

## 2018-07-27 NOTE — PROGRESS NOTE ADULT - SUBJECTIVE AND OBJECTIVE BOX
c/c: left rib pain after fall.      HPI:  88y female w/ PMH htn, CKD3, OA, vertigo presents w/ left rib pain due to fall.  Pt last seen here in April in ED w/ fall and d/c home.  Pt states she is clumsy and falls a lot. Denies loc, no dizziness, cp, sob, palp.  Lives w/ daughter.  She was admitted with Fractures of ant/lat left 8-9th ribs w/ small amt edema/hemorrhage adjacent chest wall and age indeterminant T9 vertebral fracture.  Hospital course complicated by episodes of vomiting with report of coffee ground emesis X 1.  Subsequently, anemia/renal function worsening.     7/27: pt seen and examined this am. felt ok. No bms. No n/v. No overt bleeding.     Review of system- All 10 systems reviewed and is as per HPI otherwise negative.     Vital Signs Last 24 Hrs  T(C): 36.7 (27 Jul 2018 10:54), Max: 36.9 (27 Jul 2018 00:03)  T(F): 98.1 (27 Jul 2018 10:54), Max: 98.5 (27 Jul 2018 00:03)  HR: 77 (27 Jul 2018 10:54) (73 - 81)  BP: 146/54 (27 Jul 2018 10:54) (124/40 - 154/52)  RR: 17 (27 Jul 2018 10:54) (17 - 18)  SpO2: 96% (27 Jul 2018 10:54) (96% - 96%)    PHYSICAL EXAM:    GENERAL: elderly female,laying in bed, comfortable, NAD  HEAD:  Atraumatic, Normocephalic  EYES: EOMI, PERRLA, conjunctival pallor  HEENT: Moist mucous membranes  NECK: Supple, No JVD  NERVOUS SYSTEM: Non focal.  CHEST/LUNG: decrease bs b/l, occasional rhonchi  HEART: S1, S2+, Regular rate and rhythm.  ABDOMEN: Soft, Nontender, Nondistended; Bowel sounds present  GENITOURINARY- Voiding, no palpable bladder  EXTREMITIES:  No clubbing, cyanosis, or edema  MUSCULOSKELETAL- left chest wall tenderness.  SKIN-no rash  LABS:                        10.6   x     )-----------( x        ( 27 Jul 2018 13:21 )             33.6     07-27    145  |  116<H>  |  41<H>  ----------------------------<  94  3.4<L>   |  19<L>  |  1.67<H>    Ca    8.2<L>      27 Jul 2018 06:17  Phos  3.0     07-27  Mg     1.4     07-27    TPro  x   /  Alb  x   /  TBili  0.4  /  DBili  0.1  /  AST  x   /  ALT  x   /  AlkPhos  x   07-27        MEDICATIONS  (STANDING):  amLODIPine   Tablet 10 milliGRAM(s) Oral daily  cholecalciferol 2000 Unit(s) Oral daily  cloNIDine 0.2 milliGRAM(s) Oral two times a day  levothyroxine 50 MICROGram(s) Oral daily  lidocaine   Patch 1 Patch Transdermal daily  losartan 50 milliGRAM(s) Oral daily  pantoprazole    Tablet 40 milliGRAM(s) Oral before breakfast  sertraline 50 milliGRAM(s) Oral daily  sodium chloride 0.9%. 1000 milliLiter(s) (75 mL/Hr) IV Continuous <Continuous>    MEDICATIONS  (PRN):  acetaminophen   Tablet 650 milliGRAM(s) Oral every 6 hours PRN pain or temp > 100.4  morphine  - Injectable 1 milliGRAM(s) IV Push every 6 hours PRN Severe Pain (7 - 10)  ondansetron Injectable 4 milliGRAM(s) IV Push every 8 hours PRN Nausea and/or Vomiting  oxyCODONE    5 mG/acetaminophen 325 mG 1 Tablet(s) Oral every 6 hours PRN Moderate Pain (4 - 6)  ASSESSMENT AND PLAN:  88y female PMH as above a/w     *recurrent falls, rib pain due to nondisplaced fractures ant/lat left 8-9th ribs w/ small amt edema/hemorrhage adjacent chest wall  - denies syncope  - CT head and Cspine unremarkable   - Trauma surgery eval appreciated  - Pain control  - Physical therapy    *Anemia:  -coffee ground emesis X 1 on 7/23, h/h at that time was stable  -on ppi  -h/h lower subsequently  -seen by GI, no further overt GI bleeding.  -on ppi  -f/u stool guiac  -ct a/p neg for rp bleed  -hemolysis panel negative  -repeat h/h better      *age indeterminant moderate compression fracture in the inferior endplate T9  - due to falls, no back pain     *Acute on CKD III:  -IVF  -hold losartan  -nephrology eval appreciated.   -improving.    *Status post cholecystectomy with severe intrahepatic and extrahepatic   biliary ductal dilatation  - on imaging.    -LFTs normal  -asymptomatic.    *htn   clonidine, norvasc  -hold losartan.      *Depression:  -sertraline    *Hypothyoid:  -synthroid    *dvt px  - heparin on hold for drop in h/h, possible bleeding    *dispo:  -repeat h/h in am, if stable, dc planning

## 2018-07-27 NOTE — DISCHARGE NOTE ADULT - CARE PROVIDER_API CALL
primary care physician,   Phone: (   )    -  Fax: (   )    - primary care physician,   Phone: (   )    -  Fax: (   )    -    Pacheco Rios), Gastroenterology; Internal Medicine  [address] [place] [address]  Phone: [phone]  Fax: [phone]

## 2018-07-27 NOTE — PROGRESS NOTE ADULT - ASSESSMENT
89 yo woman with long term hx of HTN.  Although pt unaware, likely has a degree of CKD.    MELISSA related to recent IV contrast and possibly decreased volume due to recent hematemesis and compromised po intake.  Agree with holding ARB until renal recovery.  Noted MRCP was initially ordered to assess Intra and extrahepatic bile duct dilatation but now cancelled.  Would need to hold off on any contrast studies until some renal recovery.  Check renal USG to assess for size and echogenicity.  Continue gentle hydration.    7/27 MK  - MELISSA/CKD improving related to ROEL in setting of volume depletion.  Dec IVF rate and continue to monitor off arb  -  "complex cyst" noted on US with subcentimeter classification on contrasted IV study--fu repeat sonogram as oupt

## 2018-07-27 NOTE — DISCHARGE NOTE ADULT - PATIENT PORTAL LINK FT
You can access the [facility] Portal, offered by [facility], by registering with the following website: [email or web address]

## 2018-07-27 NOTE — DISCHARGE NOTE ADULT - PROVIDER TOKENS
FREE:[LAST:[primary care physician],PHONE:[(   )    -],FAX:[(   )    -]] FREE:[LAST:[primary care physician],PHONE:[(   )    -],FAX:[(   )    -]],TOKEN:'[id number]'

## 2018-07-28 LAB
ANION GAP SERPL CALC-SCNC: 12 MMOL/L — SIGNIFICANT CHANGE UP (ref 5–17)
BASOPHILS # BLD AUTO: 0.03 K/UL — SIGNIFICANT CHANGE UP (ref 0–0.2)
BASOPHILS NFR BLD AUTO: 0.5 % — SIGNIFICANT CHANGE UP (ref 0–2)
BUN SERPL-MCNC: 32 MG/DL — HIGH (ref 7–23)
CALCIUM SERPL-MCNC: 8.5 MG/DL — SIGNIFICANT CHANGE UP (ref 8.5–10.1)
CHLORIDE SERPL-SCNC: 116 MMOL/L — HIGH (ref 96–108)
CO2 SERPL-SCNC: 18 MMOL/L — LOW (ref 22–31)
CREAT SERPL-MCNC: 1.22 MG/DL — SIGNIFICANT CHANGE UP (ref 0.5–1.3)
EOSINOPHIL # BLD AUTO: 0.19 K/UL — SIGNIFICANT CHANGE UP (ref 0–0.5)
EOSINOPHIL NFR BLD AUTO: 3.1 % — SIGNIFICANT CHANGE UP (ref 0–6)
GLUCOSE SERPL-MCNC: 94 MG/DL — SIGNIFICANT CHANGE UP (ref 70–99)
HCT VFR BLD CALC: 29.9 % — LOW (ref 34.5–45)
HGB BLD-MCNC: 9.7 G/DL — LOW (ref 11.5–15.5)
IMM GRANULOCYTES NFR BLD AUTO: 0.5 % — SIGNIFICANT CHANGE UP (ref 0–1.5)
LYMPHOCYTES # BLD AUTO: 0.92 K/UL — LOW (ref 1–3.3)
LYMPHOCYTES # BLD AUTO: 14.8 % — SIGNIFICANT CHANGE UP (ref 13–44)
MCHC RBC-ENTMCNC: 30.1 PG — SIGNIFICANT CHANGE UP (ref 27–34)
MCHC RBC-ENTMCNC: 32.4 GM/DL — SIGNIFICANT CHANGE UP (ref 32–36)
MCV RBC AUTO: 92.9 FL — SIGNIFICANT CHANGE UP (ref 80–100)
MONOCYTES # BLD AUTO: 0.48 K/UL — SIGNIFICANT CHANGE UP (ref 0–0.9)
MONOCYTES NFR BLD AUTO: 7.7 % — SIGNIFICANT CHANGE UP (ref 2–14)
NEUTROPHILS # BLD AUTO: 4.56 K/UL — SIGNIFICANT CHANGE UP (ref 1.8–7.4)
NEUTROPHILS NFR BLD AUTO: 73.4 % — SIGNIFICANT CHANGE UP (ref 43–77)
NRBC # BLD: 0 /100 WBCS — SIGNIFICANT CHANGE UP (ref 0–0)
PHOSPHATE SERPL-MCNC: 2.8 MG/DL — SIGNIFICANT CHANGE UP (ref 2.5–4.5)
PLATELET # BLD AUTO: 161 K/UL — SIGNIFICANT CHANGE UP (ref 150–400)
POTASSIUM SERPL-MCNC: 3 MMOL/L — LOW (ref 3.5–5.3)
POTASSIUM SERPL-SCNC: 3 MMOL/L — LOW (ref 3.5–5.3)
RBC # BLD: 3.22 M/UL — LOW (ref 3.8–5.2)
RBC # FLD: 12.9 % — SIGNIFICANT CHANGE UP (ref 10.3–14.5)
SODIUM SERPL-SCNC: 146 MMOL/L — HIGH (ref 135–145)
WBC # BLD: 6.21 K/UL — SIGNIFICANT CHANGE UP (ref 3.8–10.5)
WBC # FLD AUTO: 6.21 K/UL — SIGNIFICANT CHANGE UP (ref 3.8–10.5)

## 2018-07-28 RX ORDER — FERROUS SULFATE 325(65) MG
325 TABLET ORAL
Qty: 0 | Refills: 0 | Status: DISCONTINUED | OUTPATIENT
Start: 2018-07-28 | End: 2018-07-30

## 2018-07-28 RX ORDER — ASCORBIC ACID 60 MG
250 TABLET,CHEWABLE ORAL
Qty: 0 | Refills: 0 | Status: DISCONTINUED | OUTPATIENT
Start: 2018-07-28 | End: 2018-07-30

## 2018-07-28 RX ORDER — POTASSIUM CHLORIDE 20 MEQ
40 PACKET (EA) ORAL
Qty: 0 | Refills: 0 | Status: COMPLETED | OUTPATIENT
Start: 2018-07-28 | End: 2018-07-28

## 2018-07-28 RX ORDER — HEPARIN SODIUM 5000 [USP'U]/ML
5000 INJECTION INTRAVENOUS; SUBCUTANEOUS EVERY 12 HOURS
Qty: 0 | Refills: 0 | Status: DISCONTINUED | OUTPATIENT
Start: 2018-07-28 | End: 2018-07-30

## 2018-07-28 RX ADMIN — Medication 50 MICROGRAM(S): at 05:51

## 2018-07-28 RX ADMIN — Medication 250 MILLIGRAM(S): at 17:30

## 2018-07-28 RX ADMIN — PANTOPRAZOLE SODIUM 40 MILLIGRAM(S): 20 TABLET, DELAYED RELEASE ORAL at 05:51

## 2018-07-28 RX ADMIN — HEPARIN SODIUM 5000 UNIT(S): 5000 INJECTION INTRAVENOUS; SUBCUTANEOUS at 17:30

## 2018-07-28 RX ADMIN — Medication 0.2 MILLIGRAM(S): at 05:51

## 2018-07-28 RX ADMIN — LIDOCAINE 1 PATCH: 4 CREAM TOPICAL at 12:08

## 2018-07-28 RX ADMIN — Medication 2000 UNIT(S): at 12:12

## 2018-07-28 RX ADMIN — LIDOCAINE 1 PATCH: 4 CREAM TOPICAL at 23:03

## 2018-07-28 RX ADMIN — SERTRALINE 50 MILLIGRAM(S): 25 TABLET, FILM COATED ORAL at 12:12

## 2018-07-28 RX ADMIN — Medication 40 MILLIEQUIVALENT(S): at 10:06

## 2018-07-28 RX ADMIN — Medication 0.2 MILLIGRAM(S): at 17:29

## 2018-07-28 RX ADMIN — AMLODIPINE BESYLATE 10 MILLIGRAM(S): 2.5 TABLET ORAL at 05:51

## 2018-07-28 RX ADMIN — Medication 325 MILLIGRAM(S): at 17:31

## 2018-07-28 RX ADMIN — Medication 40 MILLIEQUIVALENT(S): at 12:11

## 2018-07-28 NOTE — PROGRESS NOTE ADULT - ASSESSMENT
89 yo woman with long term hx of HTN.  Although pt unaware, likely has a degree of CKD.    MELISSA related to recent IV contrast and possibly decreased volume due to recent hematemesis and compromised po intake.  Agree with holding ARB until renal recovery.  Noted MRCP was initially ordered to assess Intra and extrahepatic bile duct dilatation but now cancelled.  Would need to hold off on any contrast studies until some renal recovery.  Check renal USG to assess for size and echogenicity.  Continue gentle hydration.    7/27 MK  - MELISSA/CKD improving related to ROEL in setting of volume depletion.  Dec IVF rate and continue to monitor off arb  -  "complex cyst" noted on US with subcentimeter classification on contrasted IV study--fu repeat sonogram as oupt    7/28 SY  --MELISSA : much improved.  Likely a degree of CKD.  Positive echogenic and atrophic kidneys.  D/c IVF and monitor.  --As above, Complex cyst to be followed up.  --GIB : stable.

## 2018-07-28 NOTE — PROGRESS NOTE ADULT - SUBJECTIVE AND OBJECTIVE BOX
NEPHROLOGY INTERVAL HPI/OVERNIGHT EVENTS:  7/28 SY  Feeling fair.    No acute events.    HPI:  87 yo woman with hx of HTN and unknown hx of CKD admitted post fall at home sustaining Left Rib Fx.  Pt also had previous ED visit for fall at home in April.  Pt's creat was around 1.4. Hospital course has been complicated by an episode of Hematemesis. GI symptoms now stable and is eating better.   Renal evaluation requested for worsening renal function since admission.    CT of chest and abdomen done late 7/22 with IV contrast reveals atrophic right kidney.	    PMHX and PSHX.  1.HTN  2,Hx of Vertigo  3.Left Hip and Knee replacements.    MEDICATIONS  (STANDING):  amLODIPine   Tablet 10 milliGRAM(s) Oral daily  ascorbic acid 250 milliGRAM(s) Oral two times a day  cholecalciferol 2000 Unit(s) Oral daily  cloNIDine 0.2 milliGRAM(s) Oral two times a day  ferrous    sulfate 325 milliGRAM(s) Oral two times a day  heparin  Injectable 5000 Unit(s) SubCutaneous every 12 hours  levothyroxine 50 MICROGram(s) Oral daily  lidocaine   Patch 1 Patch Transdermal daily  pantoprazole    Tablet 40 milliGRAM(s) Oral before breakfast  sertraline 50 milliGRAM(s) Oral daily    MEDICATIONS  (PRN):  acetaminophen   Tablet 650 milliGRAM(s) Oral every 6 hours PRN pain or temp > 100.4  morphine  - Injectable 1 milliGRAM(s) IV Push every 6 hours PRN Severe Pain (7 - 10)  ondansetron Injectable 4 milliGRAM(s) IV Push every 8 hours PRN Nausea and/or Vomiting  oxyCODONE    5 mG/acetaminophen 325 mG 1 Tablet(s) Oral every 6 hours PRN Moderate Pain (4 - 6)          Vital Signs Last 24 Hrs  T(C): 36.8 (28 Jul 2018 11:18), Max: 36.8 (28 Jul 2018 05:15)  T(F): 98.3 (28 Jul 2018 11:18), Max: 98.3 (28 Jul 2018 11:18)  HR: 73 (28 Jul 2018 11:18) (73 - 99)  BP: 138/59 (28 Jul 2018 11:18) (138/59 - 157/63)  BP(mean): 79 (28 Jul 2018 11:18) (79 - 79)  RR: 16 (28 Jul 2018 11:18) (16 - 18)  SpO2: 96% (28 Jul 2018 11:18) (94% - 96%)  Daily     Daily     07-27 @ 07:01  -  07-28 @ 07:00  --------------------------------------------------------  IN: 240 mL / OUT: 0 mL / NET: 240 mL        PHYSICAL EXAM:  Alert and appropriate  GENERAL: No distress  CHEST/LUNG: Clear to aus  HEART: S1S2 RRR  ABDOMEN: soft  EXTREMITIES: no edema  SKIN:     LABS:                        9.7    6.21  )-----------( 161      ( 28 Jul 2018 05:58 )             29.9     07-28    146<H>  |  116<H>  |  32<H>  ----------------------------<  94  3.0<L>   |  18<L>  |  1.22    Ca    8.5      28 Jul 2018 05:58  Phos  2.8     07-28  Mg     1.4     07-27    TPro  x   /  Alb  x   /  TBili  0.4  /  DBili  0.1  /  AST  x   /  ALT  x   /  AlkPhos  x   07-27        Phosphorus Level, Serum: 2.8 mg/dL (07-28 @ 05:58)          RADIOLOGY & ADDITIONAL TESTS:

## 2018-07-28 NOTE — PROGRESS NOTE ADULT - SUBJECTIVE AND OBJECTIVE BOX
c/c: left rib pain after fall.      HPI:  88y female w/ PMH htn, CKD3, OA, vertigo presents w/ left rib pain due to fall.  Pt last seen here in April in ED w/ fall and d/c home.  Pt states she is clumsy and falls a lot. Denies loc, no dizziness, cp, sob, palp.  Lives w/ daughter.  She was admitted with Fractures of ant/lat left 8-9th ribs w/ small amt edema/hemorrhage adjacent chest wall and age indeterminant T9 vertebral fracture.  Hospital course complicated by episodes of vomiting with report of coffee ground emesis X 1.  Subsequently, anemia/jody.    7/28: pt seen and examined. Sleeping but arousable. No acute overnight events. H/h Stable from yesterday.       Review of system- All 10 systems reviewed and is as per HPI otherwise negative.     Vital Signs Last 24 Hrs  T(C): 36.8 (28 Jul 2018 11:18), Max: 36.8 (28 Jul 2018 05:15)  T(F): 98.3 (28 Jul 2018 11:18), Max: 98.3 (28 Jul 2018 11:18)  HR: 73 (28 Jul 2018 11:18) (73 - 99)  BP: 138/59 (28 Jul 2018 11:18) (138/59 - 157/63)  BP(mean): 79 (28 Jul 2018 11:18) (79 - 79)  RR: 16 (28 Jul 2018 11:18) (16 - 18)  SpO2: 96% (28 Jul 2018 11:18) (94% - 96%)    PHYSICAL EXAM:    GENERAL: elderly female,laying in bed, comfortable, NAD  HEAD:  Atraumatic, Normocephalic  EYES: EOMI, PERRLA, conjunctival pallor  HEENT: Moist mucous membranes  NECK: Supple, No JVD  NERVOUS SYSTEM: Non focal.  CHEST/LUNG: decrease bs b/l, occasional rhonchi  HEART: S1, S2+, Regular rate and rhythm.  ABDOMEN: Soft, Nontender, Nondistended; Bowel sounds present  GENITOURINARY- Voiding, no palpable bladder  EXTREMITIES:  No clubbing, cyanosis, or edema  MUSCULOSKELETAL- left chest wall tenderness.  SKIN-no rash  LABS:                        9.7    6.21  )-----------( 161      ( 28 Jul 2018 05:58 )             29.9     07-28    146<H>  |  116<H>  |  32<H>  ----------------------------<  94  3.0<L>   |  18<L>  |  1.22    Ca    8.5      28 Jul 2018 05:58  Phos  2.8     07-28  Mg     1.4     07-27    TPro  x   /  Alb  x   /  TBili  0.4  /  DBili  0.1  /  AST  x   /  ALT  x   /  AlkPhos  x   07-27        MEDICATIONS  (STANDING):  amLODIPine   Tablet 10 milliGRAM(s) Oral daily  cholecalciferol 2000 Unit(s) Oral daily  cloNIDine 0.2 milliGRAM(s) Oral two times a day  levothyroxine 50 MICROGram(s) Oral daily  lidocaine   Patch 1 Patch Transdermal daily  losartan 50 milliGRAM(s) Oral daily  pantoprazole    Tablet 40 milliGRAM(s) Oral before breakfast  sertraline 50 milliGRAM(s) Oral daily  sodium chloride 0.9%. 1000 milliLiter(s) (75 mL/Hr) IV Continuous <Continuous>    MEDICATIONS  (PRN):  acetaminophen   Tablet 650 milliGRAM(s) Oral every 6 hours PRN pain or temp > 100.4  morphine  - Injectable 1 milliGRAM(s) IV Push every 6 hours PRN Severe Pain (7 - 10)  ondansetron Injectable 4 milliGRAM(s) IV Push every 8 hours PRN Nausea and/or Vomiting  oxyCODONE    5 mG/acetaminophen 325 mG 1 Tablet(s) Oral every 6 hours PRN Moderate Pain (4 - 6)    ASSESSMENT AND PLAN:  88y female PMH as above a/w     *recurrent falls, rib pain due to nondisplaced fractures ant/lat left 8-9th ribs w/ small amt edema/hemorrhage adjacent chest wall  - denies syncope  - CT head and Cspine unremarkable   - Trauma surgery eval appreciated  - Pain control  - Physical therapy    *Anemia:  -coffee ground emesis X 1 on 7/23, h/h at that time was stable  -on ppi  -h/h lower subsequently  -seen by GI, no further overt GI bleeding.  -on ppi  -f/u stool guiac  -ct a/p neg for rp bleed  -hemolysis panel negative  -+iron deficient.  -start iron.      *age indeterminant moderate compression fracture in the inferior endplate T9  - due to falls, no back pain     *Acute on CKD III:  -IVF  -hold losartan  -nephrology eval appreciated.   -improving.    *Status post cholecystectomy with severe intrahepatic and extrahepatic   biliary ductal dilatation  - on imaging.    -LFTs normal  -asymptomatic.    *htn   clonidine, norvasc  -hold losartan.      *Depression:  -sertraline    *Hypothyoid:  -synthroid    *dvt px  -resume heparin sq

## 2018-07-29 LAB
ANION GAP SERPL CALC-SCNC: 10 MMOL/L — SIGNIFICANT CHANGE UP (ref 5–17)
BASOPHILS # BLD AUTO: 0.03 K/UL — SIGNIFICANT CHANGE UP (ref 0–0.2)
BASOPHILS NFR BLD AUTO: 0.5 % — SIGNIFICANT CHANGE UP (ref 0–2)
BUN SERPL-MCNC: 34 MG/DL — HIGH (ref 7–23)
CALCIUM SERPL-MCNC: 8.6 MG/DL — SIGNIFICANT CHANGE UP (ref 8.5–10.1)
CHLORIDE SERPL-SCNC: 117 MMOL/L — HIGH (ref 96–108)
CO2 SERPL-SCNC: 18 MMOL/L — LOW (ref 22–31)
CREAT SERPL-MCNC: 1.38 MG/DL — HIGH (ref 0.5–1.3)
EOSINOPHIL # BLD AUTO: 0.19 K/UL — SIGNIFICANT CHANGE UP (ref 0–0.5)
EOSINOPHIL NFR BLD AUTO: 2.9 % — SIGNIFICANT CHANGE UP (ref 0–6)
GLUCOSE SERPL-MCNC: 97 MG/DL — SIGNIFICANT CHANGE UP (ref 70–99)
HCT VFR BLD CALC: 29.3 % — LOW (ref 34.5–45)
HGB BLD-MCNC: 9.6 G/DL — LOW (ref 11.5–15.5)
IMM GRANULOCYTES NFR BLD AUTO: 0.2 % — SIGNIFICANT CHANGE UP (ref 0–1.5)
LYMPHOCYTES # BLD AUTO: 1.12 K/UL — SIGNIFICANT CHANGE UP (ref 1–3.3)
LYMPHOCYTES # BLD AUTO: 17.1 % — SIGNIFICANT CHANGE UP (ref 13–44)
MAGNESIUM SERPL-MCNC: 1.4 MG/DL — LOW (ref 1.6–2.6)
MCHC RBC-ENTMCNC: 30.2 PG — SIGNIFICANT CHANGE UP (ref 27–34)
MCHC RBC-ENTMCNC: 32.8 GM/DL — SIGNIFICANT CHANGE UP (ref 32–36)
MCV RBC AUTO: 92.1 FL — SIGNIFICANT CHANGE UP (ref 80–100)
MONOCYTES # BLD AUTO: 0.51 K/UL — SIGNIFICANT CHANGE UP (ref 0–0.9)
MONOCYTES NFR BLD AUTO: 7.8 % — SIGNIFICANT CHANGE UP (ref 2–14)
NEUTROPHILS # BLD AUTO: 4.68 K/UL — SIGNIFICANT CHANGE UP (ref 1.8–7.4)
NEUTROPHILS NFR BLD AUTO: 71.5 % — SIGNIFICANT CHANGE UP (ref 43–77)
NRBC # BLD: 0 /100 WBCS — SIGNIFICANT CHANGE UP (ref 0–0)
PHOSPHATE SERPL-MCNC: 2.2 MG/DL — LOW (ref 2.5–4.5)
PLATELET # BLD AUTO: 164 K/UL — SIGNIFICANT CHANGE UP (ref 150–400)
POTASSIUM SERPL-MCNC: 3.6 MMOL/L — SIGNIFICANT CHANGE UP (ref 3.5–5.3)
POTASSIUM SERPL-SCNC: 3.6 MMOL/L — SIGNIFICANT CHANGE UP (ref 3.5–5.3)
RBC # BLD: 3.18 M/UL — LOW (ref 3.8–5.2)
RBC # FLD: 13.1 % — SIGNIFICANT CHANGE UP (ref 10.3–14.5)
SODIUM SERPL-SCNC: 145 MMOL/L — SIGNIFICANT CHANGE UP (ref 135–145)
WBC # BLD: 6.54 K/UL — SIGNIFICANT CHANGE UP (ref 3.8–10.5)
WBC # FLD AUTO: 6.54 K/UL — SIGNIFICANT CHANGE UP (ref 3.8–10.5)

## 2018-07-29 RX ORDER — SODIUM,POTASSIUM PHOSPHATES 278-250MG
2 POWDER IN PACKET (EA) ORAL ONCE
Qty: 0 | Refills: 0 | Status: COMPLETED | OUTPATIENT
Start: 2018-07-29 | End: 2018-07-29

## 2018-07-29 RX ORDER — MAGNESIUM SULFATE 500 MG/ML
2 VIAL (ML) INJECTION ONCE
Qty: 0 | Refills: 0 | Status: COMPLETED | OUTPATIENT
Start: 2018-07-29 | End: 2018-07-29

## 2018-07-29 RX ADMIN — LIDOCAINE 1 PATCH: 4 CREAM TOPICAL at 11:00

## 2018-07-29 RX ADMIN — SERTRALINE 50 MILLIGRAM(S): 25 TABLET, FILM COATED ORAL at 11:00

## 2018-07-29 RX ADMIN — PANTOPRAZOLE SODIUM 40 MILLIGRAM(S): 20 TABLET, DELAYED RELEASE ORAL at 05:55

## 2018-07-29 RX ADMIN — HEPARIN SODIUM 5000 UNIT(S): 5000 INJECTION INTRAVENOUS; SUBCUTANEOUS at 05:55

## 2018-07-29 RX ADMIN — Medication 250 MILLIGRAM(S): at 05:55

## 2018-07-29 RX ADMIN — Medication 325 MILLIGRAM(S): at 17:10

## 2018-07-29 RX ADMIN — Medication 325 MILLIGRAM(S): at 05:55

## 2018-07-29 RX ADMIN — HEPARIN SODIUM 5000 UNIT(S): 5000 INJECTION INTRAVENOUS; SUBCUTANEOUS at 17:11

## 2018-07-29 RX ADMIN — Medication 2 PACKET(S): at 09:00

## 2018-07-29 RX ADMIN — Medication 250 MILLIGRAM(S): at 17:11

## 2018-07-29 RX ADMIN — Medication 50 GRAM(S): at 09:00

## 2018-07-29 RX ADMIN — Medication 0.2 MILLIGRAM(S): at 05:55

## 2018-07-29 RX ADMIN — LIDOCAINE 1 PATCH: 4 CREAM TOPICAL at 21:42

## 2018-07-29 RX ADMIN — Medication 2000 UNIT(S): at 11:01

## 2018-07-29 RX ADMIN — Medication 50 MICROGRAM(S): at 05:55

## 2018-07-29 RX ADMIN — AMLODIPINE BESYLATE 10 MILLIGRAM(S): 2.5 TABLET ORAL at 05:55

## 2018-07-29 RX ADMIN — Medication 0.2 MILLIGRAM(S): at 17:11

## 2018-07-29 NOTE — PROGRESS NOTE ADULT - SUBJECTIVE AND OBJECTIVE BOX
c/c: left rib pain after fall.      HPI:  88y female w/ PMH htn, CKD3, OA, vertigo presents w/ left rib pain due to fall.  Pt last seen here in April in ED w/ fall and d/c home.  Pt states she is clumsy and falls a lot. Denies loc, no dizziness, cp, sob, palp.  Lives w/ daughter.  She was admitted with Fractures of ant/lat left 8-9th ribs w/ small amt edema/hemorrhage adjacent chest wall and age indeterminant T9 vertebral fracture.  Hospital course complicated by episodes of vomiting with report of coffee ground emesis X 1.  Subsequently, anemia/jody.    7/29: pt seen and examined this am. Felt ok. No complaints. NO sob/chest pain    Review of system- All 10 systems reviewed and is as per HPI otherwise negative.     Vital Signs Last 24 Hrs  T(C): 37.3 (29 Jul 2018 05:26), Max: 37.3 (29 Jul 2018 05:26)  T(F): 99.1 (29 Jul 2018 05:26), Max: 99.1 (29 Jul 2018 05:26)  HR: 70 (29 Jul 2018 05:26) (70 - 73)  BP: 151/68 (29 Jul 2018 05:26) (138/59 - 151/68)  BP(mean): 79 (28 Jul 2018 11:18) (79 - 79)  RR: 16 (29 Jul 2018 05:26) (16 - 17)  SpO2: 95% (29 Jul 2018 05:26) (95% - 97%)  PHYSICAL EXAM:    GENERAL: elderly female,laying in bed, comfortable, NAD  HEAD:  Atraumatic, Normocephalic  EYES: EOMI, PERRLA, conjunctival pallor  HEENT: Moist mucous membranes  NECK: Supple, No JVD  NERVOUS SYSTEM: Non focal.  CHEST/LUNG: decrease bs b/l, occasional rhonchi  HEART: S1, S2+, Regular rate and rhythm.  ABDOMEN: Soft, Nontender, Nondistended; Bowel sounds present  GENITOURINARY- Voiding, no palpable bladder  EXTREMITIES:  No clubbing, cyanosis, or edema  MUSCULOSKELETAL- left chest wall tenderness.  SKIN-no rash    LABS:                        9.6    6.54  )-----------( 164      ( 29 Jul 2018 05:39 )             29.3     07-29    145  |  117<H>  |  34<H>  ----------------------------<  97  3.6   |  18<L>  |  1.38<H>    Ca    8.6      29 Jul 2018 05:39  Phos  2.2     07-29  Mg     1.4     07-29        MEDICATIONS  (STANDING):  amLODIPine   Tablet 10 milliGRAM(s) Oral daily  ascorbic acid 250 milliGRAM(s) Oral two times a day  cholecalciferol 2000 Unit(s) Oral daily  cloNIDine 0.2 milliGRAM(s) Oral two times a day  ferrous    sulfate 325 milliGRAM(s) Oral two times a day  heparin  Injectable 5000 Unit(s) SubCutaneous every 12 hours  levothyroxine 50 MICROGram(s) Oral daily  lidocaine   Patch 1 Patch Transdermal daily  pantoprazole    Tablet 40 milliGRAM(s) Oral before breakfast  sertraline 50 milliGRAM(s) Oral daily    MEDICATIONS  (PRN):  acetaminophen   Tablet 650 milliGRAM(s) Oral every 6 hours PRN pain or temp > 100.4  morphine  - Injectable 1 milliGRAM(s) IV Push every 6 hours PRN Severe Pain (7 - 10)  ondansetron Injectable 4 milliGRAM(s) IV Push every 8 hours PRN Nausea and/or Vomiting  oxyCODONE    5 mG/acetaminophen 325 mG 1 Tablet(s) Oral every 6 hours PRN Moderate Pain (4 - 6)    ASSESSMENT AND PLAN:  88y female PMH as above a/w     *recurrent falls, rib pain due to nondisplaced fractures ant/lat left 8-9th ribs w/ small amt edema/hemorrhage adjacent chest wall  - denies syncope  - CT head and Cspine unremarkable   - Trauma surgery eval appreciated  - Pain control  - Physical therapy    *Acute blood loss anemia/possible GI Bleed.   -coffee ground emesis X 1 on 7/23, h/h at that time was stable  -on ppi  -h/h lower subsequently  -seen by GI, no further overt GI bleeding.  -on ppi  -f/u stool guiac  -ct a/p neg for rp bleed  -hemolysis panel negative  -+iron deficient.  -started iron    *age indeterminant moderate compression fracture in the inferior endplate T9  - due to falls, no back pain     *Acute on CKD III:  -improved.  -hold losartan  -nephrology eval appreciated.       *Status post cholecystectomy with severe intrahepatic and extrahepatic   biliary ductal dilatation  - on imaging.    -LFTs normal  -asymptomatic.    *htn   clonidine, norvasc  -hold losartan.      *Depression:  -sertraline    *Hypothyoid:  -synthroid    *dvt px  -hep sq.

## 2018-07-29 NOTE — PROGRESS NOTE ADULT - SUBJECTIVE AND OBJECTIVE BOX
NEPHROLOGY INTERVAL HPI/OVERNIGHT EVENTS:  7/29 SY  No new events.  No new complaints.    7/28 SY  Feeling fair.    No acute events.    HPI:  87 yo woman with hx of HTN and unknown hx of CKD admitted post fall at home sustaining Left Rib Fx.  Pt also had previous ED visit for fall at home in April.  Pt's creat was around 1.4. Hospital course has been complicated by an episode of Hematemesis. GI symptoms now stable and is eating better.   Renal evaluation requested for worsening renal function since admission.    CT of chest and abdomen done late 7/22 with IV contrast reveals atrophic right kidney.	    PMHX and PSHX.  1.HTN  2,Hx of Vertigo  3.Left Hip and Knee replacements.      MEDICATIONS  (STANDING):  amLODIPine   Tablet 10 milliGRAM(s) Oral daily  ascorbic acid 250 milliGRAM(s) Oral two times a day  cholecalciferol 2000 Unit(s) Oral daily  cloNIDine 0.2 milliGRAM(s) Oral two times a day  ferrous    sulfate 325 milliGRAM(s) Oral two times a day  heparin  Injectable 5000 Unit(s) SubCutaneous every 12 hours  levothyroxine 50 MICROGram(s) Oral daily  lidocaine   Patch 1 Patch Transdermal daily  pantoprazole    Tablet 40 milliGRAM(s) Oral before breakfast  sertraline 50 milliGRAM(s) Oral daily    MEDICATIONS  (PRN):  acetaminophen   Tablet 650 milliGRAM(s) Oral every 6 hours PRN pain or temp > 100.4  morphine  - Injectable 1 milliGRAM(s) IV Push every 6 hours PRN Severe Pain (7 - 10)  ondansetron Injectable 4 milliGRAM(s) IV Push every 8 hours PRN Nausea and/or Vomiting  oxyCODONE    5 mG/acetaminophen 325 mG 1 Tablet(s) Oral every 6 hours PRN Moderate Pain (4 - 6)          Vital Signs Last 24 Hrs  T(C): 36.6 (29 Jul 2018 11:16), Max: 37.3 (29 Jul 2018 05:26)  T(F): 97.8 (29 Jul 2018 11:16), Max: 99.1 (29 Jul 2018 05:26)  HR: 70 (29 Jul 2018 11:16) (70 - 73)  BP: 152/58 (29 Jul 2018 11:16) (149/53 - 152/58)  BP(mean): 82 (29 Jul 2018 11:16) (82 - 82)  RR: 15 (29 Jul 2018 11:16) (15 - 17)  SpO2: 95% (29 Jul 2018 11:16) (95% - 97%)  Daily     Daily     07-28 @ 07:01  -  07-29 @ 07:00  --------------------------------------------------------  IN: 480 mL / OUT: 0 mL / NET: 480 mL    07-29 @ 07:01  -  07-29 @ 16:40  --------------------------------------------------------  IN: 460 mL / OUT: 0 mL / NET: 460 mL        PHYSICAL EXAM:  Alert and appropriate  GENERAL: No distress  CHEST/LUNG: Clear to aus  HEART: S1S2 RRR  ABDOMEN: soft  EXTREMITIES: no edema  SKIN:     LABS:                        9.6    6.54  )-----------( 164      ( 29 Jul 2018 05:39 )             29.3     07-29    145  |  117<H>  |  34<H>  ----------------------------<  97  3.6   |  18<L>  |  1.38<H>    Ca    8.6      29 Jul 2018 05:39  Phos  2.2     07-29  Mg     1.4     07-29          Magnesium, Serum: 1.4 mg/dL (07-29 @ 05:39)  Phosphorus Level, Serum: 2.2 mg/dL (07-29 @ 05:39)          RADIOLOGY & ADDITIONAL TESTS:

## 2018-07-29 NOTE — PROGRESS NOTE ADULT - ASSESSMENT
89 yo woman with long term hx of HTN.  Although pt unaware, likely has a degree of CKD.    MELISSA related to recent IV contrast and possibly decreased volume due to recent hematemesis and compromised po intake.  Agree with holding ARB until renal recovery.  Noted MRCP was initially ordered to assess Intra and extrahepatic bile duct dilatation but now cancelled.  Would need to hold off on any contrast studies until some renal recovery.  Check renal USG to assess for size and echogenicity.  Continue gentle hydration.    7/27 MK  - MELISSA/CKD improving related to ROEL in setting of volume depletion.  Dec IVF rate and continue to monitor off arb  -  "complex cyst" noted on US with subcentimeter classification on contrasted IV study--fu repeat sonogram as oupt    7/28 SY  --MELISSA : much improved.  Likely a degree of CKD.  Positive echogenic and atrophic kidneys.  D/c IVF and monitor.  --As above, Complex cyst to be followed up.  --GIB : stable.    7/29 SY  --MELISSA and likely CKD.  Renal parameters acceptable.  --Mild metabolic acidosis post NS infusion.   Follow up as outpatient to assess need for Hco3 replacement.  --Replete elctrolytes.  --GIB : stable  --From renal standpoint, ok for d/c.

## 2018-07-30 VITALS
SYSTOLIC BLOOD PRESSURE: 139 MMHG | RESPIRATION RATE: 16 BRPM | TEMPERATURE: 98 F | OXYGEN SATURATION: 98 % | DIASTOLIC BLOOD PRESSURE: 55 MMHG | HEART RATE: 65 BPM

## 2018-07-30 LAB
ANION GAP SERPL CALC-SCNC: 10 MMOL/L — SIGNIFICANT CHANGE UP (ref 5–17)
BASOPHILS # BLD AUTO: 0.02 K/UL — SIGNIFICANT CHANGE UP (ref 0–0.2)
BASOPHILS NFR BLD AUTO: 0.3 % — SIGNIFICANT CHANGE UP (ref 0–2)
BUN SERPL-MCNC: 35 MG/DL — HIGH (ref 7–23)
CALCIUM SERPL-MCNC: 8.4 MG/DL — LOW (ref 8.5–10.1)
CHLORIDE SERPL-SCNC: 118 MMOL/L — HIGH (ref 96–108)
CO2 SERPL-SCNC: 18 MMOL/L — LOW (ref 22–31)
CREAT SERPL-MCNC: 1.22 MG/DL — SIGNIFICANT CHANGE UP (ref 0.5–1.3)
EOSINOPHIL # BLD AUTO: 0.27 K/UL — SIGNIFICANT CHANGE UP (ref 0–0.5)
EOSINOPHIL NFR BLD AUTO: 4.1 % — SIGNIFICANT CHANGE UP (ref 0–6)
GLUCOSE SERPL-MCNC: 94 MG/DL — SIGNIFICANT CHANGE UP (ref 70–99)
HCT VFR BLD CALC: 29.2 % — LOW (ref 34.5–45)
HGB BLD-MCNC: 9.4 G/DL — LOW (ref 11.5–15.5)
IMM GRANULOCYTES NFR BLD AUTO: 0.5 % — SIGNIFICANT CHANGE UP (ref 0–1.5)
LYMPHOCYTES # BLD AUTO: 0.86 K/UL — LOW (ref 1–3.3)
LYMPHOCYTES # BLD AUTO: 13.1 % — SIGNIFICANT CHANGE UP (ref 13–44)
MAGNESIUM SERPL-MCNC: 1.8 MG/DL — SIGNIFICANT CHANGE UP (ref 1.6–2.6)
MCHC RBC-ENTMCNC: 29.9 PG — SIGNIFICANT CHANGE UP (ref 27–34)
MCHC RBC-ENTMCNC: 32.2 GM/DL — SIGNIFICANT CHANGE UP (ref 32–36)
MCV RBC AUTO: 93 FL — SIGNIFICANT CHANGE UP (ref 80–100)
MONOCYTES # BLD AUTO: 0.54 K/UL — SIGNIFICANT CHANGE UP (ref 0–0.9)
MONOCYTES NFR BLD AUTO: 8.2 % — SIGNIFICANT CHANGE UP (ref 2–14)
NEUTROPHILS # BLD AUTO: 4.86 K/UL — SIGNIFICANT CHANGE UP (ref 1.8–7.4)
NEUTROPHILS NFR BLD AUTO: 73.8 % — SIGNIFICANT CHANGE UP (ref 43–77)
NRBC # BLD: 0 /100 WBCS — SIGNIFICANT CHANGE UP (ref 0–0)
PHOSPHATE SERPL-MCNC: 3.2 MG/DL — SIGNIFICANT CHANGE UP (ref 2.5–4.5)
PLATELET # BLD AUTO: 194 K/UL — SIGNIFICANT CHANGE UP (ref 150–400)
POTASSIUM SERPL-MCNC: 3.7 MMOL/L — SIGNIFICANT CHANGE UP (ref 3.5–5.3)
POTASSIUM SERPL-SCNC: 3.7 MMOL/L — SIGNIFICANT CHANGE UP (ref 3.5–5.3)
RBC # BLD: 3.14 M/UL — LOW (ref 3.8–5.2)
RBC # FLD: 13.2 % — SIGNIFICANT CHANGE UP (ref 10.3–14.5)
SODIUM SERPL-SCNC: 146 MMOL/L — HIGH (ref 135–145)
WBC # BLD: 6.58 K/UL — SIGNIFICANT CHANGE UP (ref 3.8–10.5)
WBC # FLD AUTO: 6.58 K/UL — SIGNIFICANT CHANGE UP (ref 3.8–10.5)

## 2018-07-30 RX ORDER — FERROUS SULFATE 325(65) MG
1 TABLET ORAL
Qty: 0 | Refills: 0 | COMMUNITY
Start: 2018-07-30

## 2018-07-30 RX ORDER — ASCORBIC ACID 60 MG
1 TABLET,CHEWABLE ORAL
Qty: 0 | Refills: 0 | COMMUNITY
Start: 2018-07-30

## 2018-07-30 RX ADMIN — SERTRALINE 50 MILLIGRAM(S): 25 TABLET, FILM COATED ORAL at 11:45

## 2018-07-30 RX ADMIN — LIDOCAINE 1 PATCH: 4 CREAM TOPICAL at 11:45

## 2018-07-30 RX ADMIN — HEPARIN SODIUM 5000 UNIT(S): 5000 INJECTION INTRAVENOUS; SUBCUTANEOUS at 06:37

## 2018-07-30 RX ADMIN — Medication 50 MICROGRAM(S): at 06:37

## 2018-07-30 RX ADMIN — Medication 0.2 MILLIGRAM(S): at 06:37

## 2018-07-30 RX ADMIN — Medication 250 MILLIGRAM(S): at 06:33

## 2018-07-30 RX ADMIN — AMLODIPINE BESYLATE 10 MILLIGRAM(S): 2.5 TABLET ORAL at 06:33

## 2018-07-30 RX ADMIN — Medication 2000 UNIT(S): at 11:45

## 2018-07-30 RX ADMIN — PANTOPRAZOLE SODIUM 40 MILLIGRAM(S): 20 TABLET, DELAYED RELEASE ORAL at 06:33

## 2018-07-30 RX ADMIN — Medication 325 MILLIGRAM(S): at 06:37

## 2018-07-30 NOTE — PROGRESS NOTE ADULT - PROVIDER SPECIALTY LIST ADULT
Gastroenterology
Hospitalist
Nephrology
Trauma Surgery
Hospitalist

## 2018-07-30 NOTE — PROGRESS NOTE ADULT - SUBJECTIVE AND OBJECTIVE BOX
c/c: left rib pain after fall.      HPI:  88y female w/ PMH htn, CKD3, OA, vertigo presents w/ left rib pain due to fall.  Pt last seen here in April in ED w/ fall and d/c home.  Pt states she is clumsy and falls a lot. Denies loc, no dizziness, cp, sob, palp.  Lives w/ daughter.  She was admitted with Fractures of ant/lat left 8-9th ribs w/ small amt edema/hemorrhage adjacent chest wall and age indeterminant T9 vertebral fracture.  Hospital course complicated by episodes of vomiting with report of coffee ground emesis X 1.  Subsequently, anemia/melissa. CT a/p negative for RP bleed/source of bleed. Hemoglobin subsequently stabilized. Heparin sq restarted with stable h/h. MELISSA has resolved with ivf.   7/30 pt seen and examined this am. Doing well. NO acute overnight events. h/h is stable.     7/29: pt seen and examined this am. Felt ok. No complaints. NO sob/chest pain    Review of system- All 10 systems reviewed and is as per HPI otherwise negative.     Vital Signs Last 24 Hrs  T(C): 36.6 (30 Jul 2018 06:10), Max: 37.1 (29 Jul 2018 17:12)  T(F): 97.9 (30 Jul 2018 06:10), Max: 98.8 (29 Jul 2018 17:12)  HR: 72 (30 Jul 2018 06:10) (70 - 80)  BP: 160/61 (30 Jul 2018 06:10) (152/58 - 161/67)  BP(mean): 82 (29 Jul 2018 11:16) (82 - 82)  RR: 18 (30 Jul 2018 06:10) (15 - 18)  SpO2: 97% (30 Jul 2018 06:10) (95% - 98%)  PHYSICAL EXAM:    GENERAL: elderly female,laying in bed, comfortable, NAD  HEAD:  Atraumatic, Normocephalic  EYES: EOMI, PERRLA, conjunctival pallor  HEENT: Moist mucous membranes  NECK: Supple, No JVD  NERVOUS SYSTEM: Non focal.  CHEST/LUNG: decrease bs b/l, occasional rhonchi  HEART: S1, S2+, Regular rate and rhythm.  ABDOMEN: Soft, Nontender, Nondistended; Bowel sounds present  GENITOURINARY- Voiding, no palpable bladder  EXTREMITIES:  No clubbing, cyanosis, or edema  MUSCULOSKELETAL- left chest wall tenderness.  SKIN-no rash    LABS:                        9.4    6.58  )-----------( 194      ( 30 Jul 2018 06:00 )             29.2     07-30    146<H>  |  118<H>  |  35<H>  ----------------------------<  94  3.7   |  18<L>  |  1.22    Ca    8.4<L>      30 Jul 2018 06:00  Phos  3.2     07-30  Mg     1.8     07-30      MEDICATIONS  (STANDING):  amLODIPine   Tablet 10 milliGRAM(s) Oral daily  ascorbic acid 250 milliGRAM(s) Oral two times a day  cholecalciferol 2000 Unit(s) Oral daily  cloNIDine 0.2 milliGRAM(s) Oral two times a day  ferrous    sulfate 325 milliGRAM(s) Oral two times a day  heparin  Injectable 5000 Unit(s) SubCutaneous every 12 hours  levothyroxine 50 MICROGram(s) Oral daily  lidocaine   Patch 1 Patch Transdermal daily  pantoprazole    Tablet 40 milliGRAM(s) Oral before breakfast  sertraline 50 milliGRAM(s) Oral daily    MEDICATIONS  (PRN):  acetaminophen   Tablet 650 milliGRAM(s) Oral every 6 hours PRN pain or temp > 100.4  ondansetron Injectable 4 milliGRAM(s) IV Push every 8 hours PRN Nausea and/or Vomiting    ASSESSMENT AND PLAN:  88y female PMH as above a/w     *recurrent falls, rib pain due to nondisplaced fractures ant/lat left 8-9th ribs w/ small amt edema/hemorrhage adjacent chest wall  - denies syncope  - CT head and Cspine unremarkable   - Trauma surgery eval appreciated  - Pain control  - Physical therapy    *Acute blood loss anemia/possible GI Bleed.   -coffee ground emesis X 1 on 7/23, h/h at that time was stable  -on ppi  -h/h lower subsequently  -seen by GI, no further overt GI bleeding.  -on ppi  -ct a/p neg for rp bleed  -hemolysis panel negative  -+iron deficient.  -started iron  -h/h now stable.   -outpt f/u with GI    *age indeterminant moderate compression fracture in the inferior endplate T9  - due to falls, no back pain     *Acute on CKD III:  -improved.  -can resume arb as outpt if renal function stable.   -nephrology eval appreciated.       *Status post cholecystectomy with severe intrahepatic and extrahepatic   biliary ductal dilatation  - on imaging.    -LFTs normal  -asymptomatic.    *htn   clonidine, norvasc  -resume arb @ rehab      *Depression:  -sertraline    *Hypothyoid:  -synthroid    *dvt px  -hep sq.    *dispo:  dc planning for today.

## 2018-08-02 DIAGNOSIS — I12.9 HYPERTENSIVE CHRONIC KIDNEY DISEASE WITH STAGE 1 THROUGH STAGE 4 CHRONIC KIDNEY DISEASE, OR UNSPECIFIED CHRONIC KIDNEY DISEASE: ICD-10-CM

## 2018-08-02 DIAGNOSIS — K92.2 GASTROINTESTINAL HEMORRHAGE, UNSPECIFIED: ICD-10-CM
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
## 2018-08-02 DIAGNOSIS — E03.9 HYPOTHYROIDISM, UNSPECIFIED: ICD-10-CM

## 2018-08-02 DIAGNOSIS — D50.9 IRON DEFICIENCY ANEMIA, UNSPECIFIED: ICD-10-CM

## 2018-08-02 DIAGNOSIS — F41.9 ANXIETY DISORDER, UNSPECIFIED: ICD-10-CM

## 2018-08-02 DIAGNOSIS — N17.9 ACUTE KIDNEY FAILURE, UNSPECIFIED: ICD-10-CM

## 2018-08-02 DIAGNOSIS — Z96.649 PRESENCE OF UNSPECIFIED ARTIFICIAL HIP JOINT: ICD-10-CM

## 2018-08-02 DIAGNOSIS — M48.54XA COLLAPSED VERTEBRA, NOT ELSEWHERE CLASSIFIED, THORACIC REGION, INITIAL ENCOUNTER FOR FRACTURE: ICD-10-CM
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
## 2018-08-02 DIAGNOSIS — R29.6 REPEATED FALLS: ICD-10-CM

## 2018-08-02 DIAGNOSIS — E87.2 ACIDOSIS: ICD-10-CM

## 2018-08-02 DIAGNOSIS — S20.212A CONTUSION OF LEFT FRONT WALL OF THORAX, INITIAL ENCOUNTER: ICD-10-CM

## 2018-08-02 DIAGNOSIS — E86.0 DEHYDRATION: ICD-10-CM

## 2018-08-02 DIAGNOSIS — T50.8X5A ADVERSE EFFECT OF DIAGNOSTIC AGENTS, INITIAL ENCOUNTER: ICD-10-CM

## 2018-08-02 DIAGNOSIS — Z96.659 PRESENCE OF UNSPECIFIED ARTIFICIAL KNEE JOINT: ICD-10-CM

## 2018-08-02 DIAGNOSIS — D62 ACUTE POSTHEMORRHAGIC ANEMIA: ICD-10-CM

## 2018-08-02 DIAGNOSIS — S22.42XA MULTIPLE FRACTURES OF RIBS, LEFT SIDE, INITIAL ENCOUNTER FOR CLOSED FRACTURE: ICD-10-CM

## 2018-08-02 DIAGNOSIS — N28.1 CYST OF KIDNEY, ACQUIRED: ICD-10-CM

## 2018-08-02 DIAGNOSIS — N18.3 CHRONIC KIDNEY DISEASE, STAGE 3 (MODERATE): ICD-10-CM

## 2018-08-02 DIAGNOSIS — R07.81 PLEURODYNIA: ICD-10-CM

## 2018-08-02 DIAGNOSIS — R11.10 VOMITING, UNSPECIFIED: ICD-10-CM

## 2018-08-02 DIAGNOSIS — M19.90 UNSPECIFIED OSTEOARTHRITIS, UNSPECIFIED SITE: ICD-10-CM

## 2018-08-02 DIAGNOSIS — Z90.49 ACQUIRED ABSENCE OF OTHER SPECIFIED PARTS OF DIGESTIVE TRACT: ICD-10-CM

## 2018-08-02 DIAGNOSIS — Z99.3 DEPENDENCE ON WHEELCHAIR: ICD-10-CM

## 2018-08-02 DIAGNOSIS — K83.8 OTHER SPECIFIED DISEASES OF BILIARY TRACT: ICD-10-CM

## 2018-08-02 NOTE — ED PROVIDER NOTE - CADM PMH OBGYN IS PREGNANT YN
No Post-Care Instructions: Detailed post-operative instructions were reviewed with the patient and copy provided (see attached).  Advised patient of recommendation for adjuvant radiation and to diligently monitor for new, changing, persistent, and/or recurrent lesions, and if present, seek evaluation ASAP.  Patient to follow up as advised and as per patient's general dermatology provider.  Risks of failure to follow up as directed reviewed.

## 2018-08-13 ENCOUNTER — APPOINTMENT (OUTPATIENT)
Dept: INTERNAL MEDICINE | Facility: CLINIC | Age: 83
End: 2018-08-13

## 2018-08-15 NOTE — ED ADULT TRIAGE NOTE - BMI (KG/M2)
[facility]  Discharge Note       Description of Procedure: Right L3, L4, L5 Lumbar Medial Branch Block under Fluoroscopic Guidance    Procedure Date: 8/15/2018    Admit Date: 8/15/2018  Discharge Date: 8/15/2018     Attending Physician: Isael Ram   Discharge Provider: Isael Ram    Preoperative Diagnosis: Lumbar Spondylosis, Lumbar Facet Arthropathy     Postoperative Diagnosis: as above, same as preoperative diagnosis    Discharged Condition: Stable    Hospital Course: Patient was admitted for an outpatient procedure. The procedure was tolerated well with no complications.    Final Diagnoses: Same as principal problem.    Disposition: Home, self-care.    Follow up/Patient Instructions:  Follow-up in clinic in 2-3 weeks.    Medications: No medications were prescribed today. The patient was advised to resume normal medication regimen without change.  Specific information was provided regarding restarting any anticoagulant/s.    Discharge Procedure Orders (must include Diet, Follow-up, Activity):  Light activity for the remainder of the day, resume normal activity tomorrow. Resume normal diet. Follow-up in clinic in 2-3 weeks.  
29

## 2018-09-13 ENCOUNTER — INPATIENT (INPATIENT)
Facility: HOSPITAL | Age: 83
LOS: 3 days | Discharge: HOME | End: 2018-09-17
Attending: INTERNAL MEDICINE | Admitting: INTERNAL MEDICINE
Payer: MEDICARE

## 2018-09-13 VITALS — HEIGHT: 61 IN | WEIGHT: 160.94 LBS

## 2018-09-13 LAB
ALBUMIN SERPL ELPH-MCNC: 4.3 G/DL — SIGNIFICANT CHANGE UP (ref 3.3–5)
ALP SERPL-CCNC: 77 U/L — SIGNIFICANT CHANGE UP (ref 40–120)
ALT FLD-CCNC: 19 U/L — SIGNIFICANT CHANGE UP (ref 12–78)
ANION GAP SERPL CALC-SCNC: 11 MMOL/L — SIGNIFICANT CHANGE UP (ref 5–17)
AST SERPL-CCNC: 17 U/L — SIGNIFICANT CHANGE UP (ref 15–37)
BASOPHILS # BLD AUTO: 0.01 K/UL — SIGNIFICANT CHANGE UP (ref 0–0.2)
BASOPHILS NFR BLD AUTO: 0.2 % — SIGNIFICANT CHANGE UP (ref 0–2)
BILIRUB SERPL-MCNC: 0.4 MG/DL — SIGNIFICANT CHANGE UP (ref 0.2–1.2)
BUN SERPL-MCNC: 34 MG/DL — HIGH (ref 7–23)
CALCIUM SERPL-MCNC: 9.8 MG/DL — SIGNIFICANT CHANGE UP (ref 8.5–10.1)
CHLORIDE SERPL-SCNC: 113 MMOL/L — HIGH (ref 96–108)
CO2 SERPL-SCNC: 19 MMOL/L — LOW (ref 22–31)
CREAT SERPL-MCNC: 1.47 MG/DL — HIGH (ref 0.5–1.3)
EOSINOPHIL # BLD AUTO: 0.13 K/UL — SIGNIFICANT CHANGE UP (ref 0–0.5)
EOSINOPHIL NFR BLD AUTO: 2.3 % — SIGNIFICANT CHANGE UP (ref 0–6)
GLUCOSE SERPL-MCNC: 101 MG/DL — HIGH (ref 70–99)
HCT VFR BLD CALC: 31.9 % — LOW (ref 34.5–45)
HGB BLD-MCNC: 10.5 G/DL — LOW (ref 11.5–15.5)
IMM GRANULOCYTES NFR BLD AUTO: 0.4 % — SIGNIFICANT CHANGE UP (ref 0–1.5)
LYMPHOCYTES # BLD AUTO: 0.92 K/UL — LOW (ref 1–3.3)
LYMPHOCYTES # BLD AUTO: 16.2 % — SIGNIFICANT CHANGE UP (ref 13–44)
MCHC RBC-ENTMCNC: 32 PG — SIGNIFICANT CHANGE UP (ref 27–34)
MCHC RBC-ENTMCNC: 32.9 GM/DL — SIGNIFICANT CHANGE UP (ref 32–36)
MCV RBC AUTO: 97.3 FL — SIGNIFICANT CHANGE UP (ref 80–100)
MONOCYTES # BLD AUTO: 0.48 K/UL — SIGNIFICANT CHANGE UP (ref 0–0.9)
MONOCYTES NFR BLD AUTO: 8.4 % — SIGNIFICANT CHANGE UP (ref 2–14)
NEUTROPHILS # BLD AUTO: 4.13 K/UL — SIGNIFICANT CHANGE UP (ref 1.8–7.4)
NEUTROPHILS NFR BLD AUTO: 72.5 % — SIGNIFICANT CHANGE UP (ref 43–77)
NRBC # BLD: 0 /100 WBCS — SIGNIFICANT CHANGE UP (ref 0–0)
NT-PROBNP SERPL-SCNC: 3638 PG/ML — HIGH (ref 0–450)
PLATELET # BLD AUTO: 198 K/UL — SIGNIFICANT CHANGE UP (ref 150–400)
POTASSIUM SERPL-MCNC: 4.2 MMOL/L — SIGNIFICANT CHANGE UP (ref 3.5–5.3)
POTASSIUM SERPL-SCNC: 4.2 MMOL/L — SIGNIFICANT CHANGE UP (ref 3.5–5.3)
PROT SERPL-MCNC: 8.6 GM/DL — HIGH (ref 6–8.3)
RBC # BLD: 3.28 M/UL — LOW (ref 3.8–5.2)
RBC # FLD: 13.7 % — SIGNIFICANT CHANGE UP (ref 10.3–14.5)
SODIUM SERPL-SCNC: 143 MMOL/L — SIGNIFICANT CHANGE UP (ref 135–145)
TROPONIN I SERPL-MCNC: 0.03 NG/ML — SIGNIFICANT CHANGE UP (ref 0.01–0.04)
TROPONIN I SERPL-MCNC: 0.04 NG/ML — SIGNIFICANT CHANGE UP (ref 0.01–0.04)
WBC # BLD: 5.69 K/UL — SIGNIFICANT CHANGE UP (ref 3.8–10.5)
WBC # FLD AUTO: 5.69 K/UL — SIGNIFICANT CHANGE UP (ref 3.8–10.5)

## 2018-09-13 PROCEDURE — 93010 ELECTROCARDIOGRAM REPORT: CPT

## 2018-09-13 PROCEDURE — 93970 EXTREMITY STUDY: CPT | Mod: 26

## 2018-09-13 PROCEDURE — 99285 EMERGENCY DEPT VISIT HI MDM: CPT

## 2018-09-13 PROCEDURE — 71045 X-RAY EXAM CHEST 1 VIEW: CPT | Mod: 26

## 2018-09-13 RX ORDER — FUROSEMIDE 40 MG
40 TABLET ORAL DAILY
Qty: 0 | Refills: 0 | Status: DISCONTINUED | OUTPATIENT
Start: 2018-09-13 | End: 2018-09-15

## 2018-09-13 RX ORDER — SERTRALINE 25 MG/1
1 TABLET, FILM COATED ORAL
Qty: 0 | Refills: 0 | COMMUNITY

## 2018-09-13 RX ORDER — HEPARIN SODIUM 5000 [USP'U]/ML
5000 INJECTION INTRAVENOUS; SUBCUTANEOUS EVERY 8 HOURS
Qty: 0 | Refills: 0 | Status: DISCONTINUED | OUTPATIENT
Start: 2018-09-13 | End: 2018-09-13

## 2018-09-13 RX ORDER — DONEPEZIL HYDROCHLORIDE 10 MG/1
1 TABLET, FILM COATED ORAL
Qty: 0 | Refills: 0 | COMMUNITY

## 2018-09-13 RX ORDER — SERTRALINE 25 MG/1
25 TABLET, FILM COATED ORAL DAILY
Qty: 0 | Refills: 0 | Status: DISCONTINUED | OUTPATIENT
Start: 2018-09-13 | End: 2018-09-17

## 2018-09-13 RX ORDER — AMLODIPINE BESYLATE 2.5 MG/1
1 TABLET ORAL
Qty: 0 | Refills: 0 | COMMUNITY

## 2018-09-13 RX ORDER — DONEPEZIL HYDROCHLORIDE 10 MG/1
5 TABLET, FILM COATED ORAL AT BEDTIME
Qty: 0 | Refills: 0 | Status: DISCONTINUED | OUTPATIENT
Start: 2018-09-13 | End: 2018-09-17

## 2018-09-13 RX ORDER — CHOLECALCIFEROL (VITAMIN D3) 125 MCG
1 CAPSULE ORAL
Qty: 0 | Refills: 0 | COMMUNITY

## 2018-09-13 RX ORDER — PANTOPRAZOLE SODIUM 20 MG/1
1 TABLET, DELAYED RELEASE ORAL
Qty: 0 | Refills: 0 | COMMUNITY

## 2018-09-13 RX ORDER — OXYCODONE AND ACETAMINOPHEN 5; 325 MG/1; MG/1
1 TABLET ORAL EVERY 6 HOURS
Qty: 0 | Refills: 0 | Status: DISCONTINUED | OUTPATIENT
Start: 2018-09-13 | End: 2018-09-17

## 2018-09-13 RX ORDER — PANTOPRAZOLE SODIUM 20 MG/1
40 TABLET, DELAYED RELEASE ORAL
Qty: 0 | Refills: 0 | Status: DISCONTINUED | OUTPATIENT
Start: 2018-09-13 | End: 2018-09-17

## 2018-09-13 RX ORDER — ACETAMINOPHEN 500 MG
650 TABLET ORAL EVERY 8 HOURS
Qty: 0 | Refills: 0 | Status: DISCONTINUED | OUTPATIENT
Start: 2018-09-13 | End: 2018-09-17

## 2018-09-13 RX ORDER — ASPIRIN/CALCIUM CARB/MAGNESIUM 324 MG
81 TABLET ORAL DAILY
Qty: 0 | Refills: 0 | Status: DISCONTINUED | OUTPATIENT
Start: 2018-09-13 | End: 2018-09-17

## 2018-09-13 RX ORDER — FERROUS SULFATE 325(65) MG
325 TABLET ORAL
Qty: 0 | Refills: 0 | Status: DISCONTINUED | OUTPATIENT
Start: 2018-09-13 | End: 2018-09-17

## 2018-09-13 RX ORDER — LEVOTHYROXINE SODIUM 125 MCG
50 TABLET ORAL DAILY
Qty: 0 | Refills: 0 | Status: DISCONTINUED | OUTPATIENT
Start: 2018-09-13 | End: 2018-09-17

## 2018-09-13 RX ORDER — OMEPRAZOLE 10 MG/1
1 CAPSULE, DELAYED RELEASE ORAL
Qty: 0 | Refills: 0 | COMMUNITY

## 2018-09-13 RX ORDER — LEVOTHYROXINE SODIUM 125 MCG
1 TABLET ORAL
Qty: 0 | Refills: 0 | COMMUNITY

## 2018-09-13 RX ORDER — HYDRALAZINE HCL 50 MG
10 TABLET ORAL ONCE
Qty: 0 | Refills: 0 | Status: COMPLETED | OUTPATIENT
Start: 2018-09-13 | End: 2018-09-13

## 2018-09-13 RX ORDER — FUROSEMIDE 40 MG
40 TABLET ORAL ONCE
Qty: 0 | Refills: 0 | Status: COMPLETED | OUTPATIENT
Start: 2018-09-13 | End: 2018-09-13

## 2018-09-13 RX ORDER — ASPIRIN/CALCIUM CARB/MAGNESIUM 324 MG
1 TABLET ORAL
Qty: 0 | Refills: 0 | COMMUNITY

## 2018-09-13 RX ORDER — AMLODIPINE BESYLATE 2.5 MG/1
10 TABLET ORAL DAILY
Qty: 0 | Refills: 0 | Status: DISCONTINUED | OUTPATIENT
Start: 2018-09-13 | End: 2018-09-17

## 2018-09-13 RX ADMIN — Medication 10 MILLIGRAM(S): at 19:49

## 2018-09-13 RX ADMIN — Medication 40 MILLIGRAM(S): at 20:15

## 2018-09-13 NOTE — ED STATDOCS - MEDICAL DECISION MAKING DETAILS
EKG nonischemic.  Troponin detectable, but within normal range.  Elevated BNP.  US negative for DVT.  Question new CHF.  Also hypertensive, given hydralazine, lasix.  Admit to tele service for further revaluation and management.

## 2018-09-13 NOTE — H&P ADULT - NSHPPHYSICALEXAM_GEN_ALL_CORE
Vital Signs Last 24 Hrs  T(C): 37.1 (13 Sep 2018 19:10), Max: 37.3 (13 Sep 2018 15:36)  T(F): 98.8 (13 Sep 2018 19:10), Max: 99.2 (13 Sep 2018 15:36)  HR: 95 (13 Sep 2018 22:01) (86 - 130)  BP: 169/68 (13 Sep 2018 22:01) (152/94 - 211/92)  BP(mean): 99 (13 Sep 2018 22:01) (99 - 112)  RR: 15 (13 Sep 2018 22:01) (15 - 16)  SpO2: 95% (13 Sep 2018 22:01) (95% - 96%)

## 2018-09-13 NOTE — ED ADULT TRIAGE NOTE - CHIEF COMPLAINT QUOTE
pt sent from home care nurse pt was d/c'd yesterday from [facility] , pt BP was 191/96 prior to receiving amlodipine 10mg and clonidine 0.1 mg po , repeat bp 184/76

## 2018-09-13 NOTE — H&P ADULT - ASSESSMENT
89 yo female presented with uncontrolled BP.    A/P:    1.  Hypertensive urgency  New onset CHF  -will start home medication for BP control  -will start IV Lasix  -will follow echo report   -follow cardiology consult   -follow BP and adjust medication as needed for BP control  -follow clinically in telemetry  -daily weight  -Is and Os    2.  CKD stage 3  -slightly increased creatinine  -will follow     3.  Hypothyroidism  -on levothyroxine    4.  Dementia  -continue home meds    5.  GERD  -on PPI    6.  Chronic pain syndromes  -on oxycodone    7.  SCD for DVT ppx

## 2018-09-13 NOTE — ED STATDOCS - OBJECTIVE STATEMENT
89 y/o female with a PMHx HTN presents to the ED regarding high blood pressure today. Pt was at her nursing home and was found to have BP of 191/96. Pt then received Amlodipine 10mg, Clonidine 0.1mg PO and the repeat BP was 184/76 so was sent to ED by visiting nurse. +bilateral LE swelling, at baseline. No HA, dizziness, chest pain, SOB or any other acute complaint at this time. Pt was discharged from [facility]'s rehab yesterday for fractured rib.

## 2018-09-13 NOTE — H&P ADULT - HISTORY OF PRESENT ILLNESS
87 y/o female with a PMHx HTN presents to the ED regarding high blood pressure today. Pt was at her nursing home and was found to have BP of 191/96. Pt then received Amlodipine 10mg, Clonidine 0.1mg PO and the repeat BP was 184/76 so was sent to ED by visiting nurse. +bilateral LE swelling, at baseline. No HA, dizziness, chest pain, SOB or any other acute complaint at this time. Pt was discharged from [facility]'s rehab yesterday for fractured rib. 89 y/o female with a PMHx HTN presents to the ED regarding high blood pressure today. Pt was at her home and was found to have BP of 191/96. Pt then received Amlodipine 10mg, Clonidine 0.1mg PO and the repeat BP was 184/76, so was sent to ED by visiting nurse. +bilateral LE swelling, at baseline. No HA, dizziness, chest pain, SOB or any other acute complaint at this time. Pt was discharged from [facility]'s rehab yesterday for fractured rib.     PSHx:  No recent surgeries    Family hx:  Patient is unable to verify any family history due to her clinica status- dementia

## 2018-09-13 NOTE — ED ADULT NURSE NOTE - OBJECTIVE STATEMENT
pt sent in from nursing home today with elevating blood pressure with BL LE swelling. denies chest pain, SOB

## 2018-09-13 NOTE — ED ADULT NURSE NOTE - NSIMPLEMENTINTERV_GEN_ALL_ED
Implemented All Fall with Harm Risk Interventions:  [place] to call system. Call bell, personal items and telephone within reach. Instruct patient to call for assistance. Room bathroom lighting operational. Non-slip footwear when patient is off stretcher. Physically safe environment: no spills, clutter or unnecessary equipment. Stretcher in lowest position, wheels locked, appropriate side rails in place. Provide visual cue, wrist band, yellow gown, etc. Monitor gait and stability. Monitor for mental status changes and reorient to person, place, and time. Review medications for side effects contributing to fall risk. Reinforce activity limits and safety measures with patient and family. Provide visual clues: red socks.

## 2018-09-13 NOTE — ED STATDOCS - PROGRESS NOTE DETAILS
87 y/o female with a PMHx HTN presents to the ED regarding high blood pressure today. Pt was at her nursing home and was found to have BP of 191/96. Pt then received Amlodipine 10mg, Clonidine 0.1mg PO and the repeat BP was 184/76 so was sent to ED by visiting nurse. +bilateral LE swelling, at baseline. No HA, dizziness, chest pain, SOB or any other acute complaint at this time. Pt was discharged from [facility]'s rehab yesterday for fractured rib. PE: lungs: CTAB, no wheezing/rhonci/rales cardiac: s1-s2 RRR. plan: labs, us and reeval -Martha Oliveira PA-C pt and family aware of ce results willing to stay for repeat ce -Martha Oliveira PA-C nurse states EKG was not done yet told her to do EKG stat, pt BP elevated but denies any HA, Cp, Sob, Dyspnea, lightheadness, cp, sob, dyspnea or any other complaints currently. D/W Dr. Audi Oliveira PA-C pt reeval aware of elevated bnp and will admit for new onset chf and htn emergency pt agrees to stay dr dave barr pt at bedside. -Martha Oliveira PA-C

## 2018-09-13 NOTE — ED STATDOCS - CARE PLAN
Principal Discharge DX:	Hypertension, unspecified type  Secondary Diagnosis:	Acute congestive heart failure, unspecified heart failure type

## 2018-09-13 NOTE — ED ADULT NURSE NOTE - CHIEF COMPLAINT QUOTE
pt sent from home care nurse pt was d/c'd yesterday from [facility] , pt BP was 191/96 prior to receiving amlodipine 10mg and clonidine 0.1 mg po , repeat bp 184/76

## 2018-09-14 LAB
ANION GAP SERPL CALC-SCNC: 12 MMOL/L — SIGNIFICANT CHANGE UP (ref 5–17)
BASOPHILS # BLD AUTO: 0.03 K/UL — SIGNIFICANT CHANGE UP (ref 0–0.2)
BASOPHILS NFR BLD AUTO: 0.5 % — SIGNIFICANT CHANGE UP (ref 0–2)
BUN SERPL-MCNC: 34 MG/DL — HIGH (ref 7–23)
CALCIUM SERPL-MCNC: 9.8 MG/DL — SIGNIFICANT CHANGE UP (ref 8.5–10.1)
CHLORIDE SERPL-SCNC: 113 MMOL/L — HIGH (ref 96–108)
CO2 SERPL-SCNC: 19 MMOL/L — LOW (ref 22–31)
CREAT SERPL-MCNC: 1.44 MG/DL — HIGH (ref 0.5–1.3)
EOSINOPHIL # BLD AUTO: 0.17 K/UL — SIGNIFICANT CHANGE UP (ref 0–0.5)
EOSINOPHIL NFR BLD AUTO: 2.7 % — SIGNIFICANT CHANGE UP (ref 0–6)
GLUCOSE SERPL-MCNC: 99 MG/DL — SIGNIFICANT CHANGE UP (ref 70–99)
HCT VFR BLD CALC: 30.8 % — LOW (ref 34.5–45)
HGB BLD-MCNC: 10.1 G/DL — LOW (ref 11.5–15.5)
IMM GRANULOCYTES NFR BLD AUTO: 0.5 % — SIGNIFICANT CHANGE UP (ref 0–1.5)
LYMPHOCYTES # BLD AUTO: 0.84 K/UL — LOW (ref 1–3.3)
LYMPHOCYTES # BLD AUTO: 13.3 % — SIGNIFICANT CHANGE UP (ref 13–44)
MCHC RBC-ENTMCNC: 31.4 PG — SIGNIFICANT CHANGE UP (ref 27–34)
MCHC RBC-ENTMCNC: 32.8 GM/DL — SIGNIFICANT CHANGE UP (ref 32–36)
MCV RBC AUTO: 95.7 FL — SIGNIFICANT CHANGE UP (ref 80–100)
MONOCYTES # BLD AUTO: 0.68 K/UL — SIGNIFICANT CHANGE UP (ref 0–0.9)
MONOCYTES NFR BLD AUTO: 10.8 % — SIGNIFICANT CHANGE UP (ref 2–14)
NEUTROPHILS # BLD AUTO: 4.55 K/UL — SIGNIFICANT CHANGE UP (ref 1.8–7.4)
NEUTROPHILS NFR BLD AUTO: 72.2 % — SIGNIFICANT CHANGE UP (ref 43–77)
NRBC # BLD: 0 /100 WBCS — SIGNIFICANT CHANGE UP (ref 0–0)
PLATELET # BLD AUTO: 195 K/UL — SIGNIFICANT CHANGE UP (ref 150–400)
POTASSIUM SERPL-MCNC: 3.9 MMOL/L — SIGNIFICANT CHANGE UP (ref 3.5–5.3)
POTASSIUM SERPL-SCNC: 3.9 MMOL/L — SIGNIFICANT CHANGE UP (ref 3.5–5.3)
RBC # BLD: 3.22 M/UL — LOW (ref 3.8–5.2)
RBC # FLD: 13.9 % — SIGNIFICANT CHANGE UP (ref 10.3–14.5)
SODIUM SERPL-SCNC: 144 MMOL/L — SIGNIFICANT CHANGE UP (ref 135–145)
WBC # BLD: 6.3 K/UL — SIGNIFICANT CHANGE UP (ref 3.8–10.5)
WBC # FLD AUTO: 6.3 K/UL — SIGNIFICANT CHANGE UP (ref 3.8–10.5)

## 2018-09-14 PROCEDURE — 93306 TTE W/DOPPLER COMPLETE: CPT | Mod: 26

## 2018-09-14 RX ORDER — METOPROLOL TARTRATE 50 MG
25 TABLET ORAL
Qty: 0 | Refills: 0 | Status: DISCONTINUED | OUTPATIENT
Start: 2018-09-14 | End: 2018-09-17

## 2018-09-14 RX ADMIN — Medication 325 MILLIGRAM(S): at 17:49

## 2018-09-14 RX ADMIN — Medication 325 MILLIGRAM(S): at 06:32

## 2018-09-14 RX ADMIN — Medication 25 MILLIGRAM(S): at 17:50

## 2018-09-14 RX ADMIN — Medication 0.2 MILLIGRAM(S): at 17:50

## 2018-09-14 RX ADMIN — DONEPEZIL HYDROCHLORIDE 5 MILLIGRAM(S): 10 TABLET, FILM COATED ORAL at 21:18

## 2018-09-14 RX ADMIN — Medication 81 MILLIGRAM(S): at 12:40

## 2018-09-14 RX ADMIN — AMLODIPINE BESYLATE 10 MILLIGRAM(S): 2.5 TABLET ORAL at 04:41

## 2018-09-14 RX ADMIN — Medication 40 MILLIGRAM(S): at 06:32

## 2018-09-14 RX ADMIN — Medication 0.2 MILLIGRAM(S): at 04:41

## 2018-09-14 RX ADMIN — SERTRALINE 25 MILLIGRAM(S): 25 TABLET, FILM COATED ORAL at 12:41

## 2018-09-14 RX ADMIN — PANTOPRAZOLE SODIUM 40 MILLIGRAM(S): 20 TABLET, DELAYED RELEASE ORAL at 06:32

## 2018-09-14 RX ADMIN — Medication 50 MICROGRAM(S): at 06:32

## 2018-09-14 NOTE — PHYSICAL THERAPY INITIAL EVALUATION ADULT - MODALITIES TREATMENT COMMENTS
Patient  left in chair with CBIR and chair alarm active. Patient instructed to call for assistance back to bed or the bathroom. RN informed of session.

## 2018-09-14 NOTE — PROGRESS NOTE ADULT - SUBJECTIVE AND OBJECTIVE BOX
Chart and meds reviewed.  Patient seen and examined.    CC: Elevated BP    HPI: 89 y/o female with a PMHx HTN presents to the ED regarding high blood pressure today. Pt discharged fro [facility] rehab 1 day prior to her ED arrival and was undergoing VNS home assessment when she was found to have BP of 191/96. Pt then received Amlodipine 10mg, Clonidine 0.1mg PO and the repeat BP was 184/76, so was sent to ED by visiting nurse.     Subjective  9/14/18: feels "good" Denies CP/palpitation/SOB/HA/dizziness/abd pain/n/v/d/f/c; has chronic generalized pain; denies pain to legs.      REVIEW OF SYSTEM: ROS comprehensively negative except as above      MEDICATIONS  (STANDING):  amLODIPine   Tablet 10 milliGRAM(s) Oral daily  aspirin enteric coated 81 milliGRAM(s) Oral daily  cloNIDine 0.2 milliGRAM(s) Oral two times a day  donepezil 5 milliGRAM(s) Oral at bedtime  ferrous    sulfate 325 milliGRAM(s) Oral two times a day  furosemide   Injectable 40 milliGRAM(s) IV Push daily  levothyroxine 50 MICROGram(s) Oral daily  metoprolol tartrate 25 milliGRAM(s) Oral two times a day  pantoprazole    Tablet 40 milliGRAM(s) Oral before breakfast  sertraline 25 milliGRAM(s) Oral daily    MEDICATIONS  (PRN):  acetaminophen   Tablet .. 650 milliGRAM(s) Oral every 8 hours PRN Temp greater or equal to 38C (100.4F), Mild Pain (1 - 3)  oxyCODONE    5 mG/acetaminophen 325 mG 1 Tablet(s) Oral every 6 hours PRN Moderate Pain (4 - 6)      VITALS:  Vital Signs Last 24 Hrs  T(C): 36.8 (14 Sep 2018 11:29), Max: 37.1 (13 Sep 2018 19:10)  T(F): 98.3 (14 Sep 2018 11:29), Max: 98.8 (13 Sep 2018 19:10)  HR: 95 (14 Sep 2018 11:29) (86 - 130)  BP: 156/60 (14 Sep 2018 11:29) (152/94 - 211/92)  BP(mean): 99 (14 Sep 2018 00:28) (99 - 112)  RR: 17 (14 Sep 2018 11:29) (15 - 18)  SpO2: 94% (14 Sep 2018 11:29) (94% - 96%)        PHYSICAL EXAM:  General:   HEENT:  pupils equal and reactive, EOMI, no oropharyngeal lesions, erythema, exudates, oral thrush  NECK:   supple, no carotid bruits, no palpable lymph nodes, no thyromegaly  CV:  +S1, +S2, regular, no murmurs or rubs  RESP:   lungs clear to auscultation bilaterally, no wheezing, rales, rhonchi, good air entry bilaterally  GI:  abdomen soft, non-tender, non-distended, normal BS, no bruits, no abdominal masses, no palpable masses  MSK:   normal muscle tone, no atrophy, no rigidity, no contractions  EXT:  trace pedal edema, no clubbing, no cyanosis,  no calf pain,erythema  VASCULAR:  pulses equal and symmetric in the upper and lower extremities  NEURO:  AAOX3, no focal neurological deficits, follows all commands, able to move extremities spontaneously  SKIN:  no ulcers, lesions or rashes    LABS:                       10.1   6.30  )-----------( 195      ( 14 Sep 2018 05:15 )             30.8     09-14    144  |  113<H>  |  34<H>  ----------------------------<  99  3.9   |  19<L>  |  1.44<H>    Ca    9.8      14 Sep 2018 05:15    TPro  8.6<H>  /  Alb  4.3  /  TBili  0.4  /  DBili  x   /  AST  17  /  ALT  19  /  AlkPhos  77  09-13    CARDIAC MARKERS ( 13 Sep 2018 21:27 )  0.034 ng/mL / x     / x     / x     / x      CARDIAC MARKERS ( 13 Sep 2018 17:08 )  0.038 ng/mL / x     / x     / x     / x        LIVER FUNCTIONS - ( 13 Sep 2018 17:08 )  Alb: 4.3 g/dL / Pro: 8.6 gm/dL / ALK PHOS: 77 U/L / ALT: 19 U/L / AST: 17 U/L / GGT: x         Serum Pro-Brain Natriuretic Peptide: 3638 pg/mL (09-13-18 @ 17:08)

## 2018-09-14 NOTE — PROGRESS NOTE ADULT - ASSESSMENT
Hypertensive urgency /New onset CHF  - BP better controlled ('s)  - con't IV lasix 40 daily / clonidine 0.2 bid / lopressor 25mg bid / norvasc  - TTE--> mild AR / normal LVEF  - appreciate cardiology consult   - con't telemonitor  -daily weight / I & O's    CKD stage 3  - slightly increased creatinine on adm w/ slight downtrend today (1.47--> 1.44)  - con't to monitor    Hypothyroidism  -on levothyroxine    Dementia  -continue home meds    GERD  -on PPI    Chronic pain syndromes  -on oxycodone    SCD for DVT ppx Hypertensive urgency /New onset CHF  - BP better controlled ('s- 160's)  - con't IV lasix 40 daily for 24 hrs then change to po   continue / clonidine 0.2 bid /   addedlopressor 25mg bid /  continue  norvasc  will do CT chest for evaluation of severity of CHF  - TTE--> mild AR / normal LVEF  - appreciate cardiology consult   - con't telemonitor  -daily weight / I & O's  avoidACEi/ARB NSAIDS due to CKD    CKD stage 3 with chronic normal anion gap metabolic acidosis  - slightly increased creatinine on adm w/ slight downtrend today (1.47--> 1.44)  - con't to monitor    Hypothyroidism  -on levothyroxine    Dementia  -continue home meds    GERD  -on PPI    Chronic pain syndromes  -on oxycodone    SCD for DVT ppx   no AC due to hx of bleed in past    attending physician  Patient seen and examined with the NP. Agree with above plan of care which was discussed with the patient, family, team and NP.   necessary amendments to note were made as below  Hypertensive urgency /New onset CHF  - BP better controlled ('s- 160's)  - con't IV lasix 40 daily for 24 hrs then change to po   continue / clonidine 0.2 bid /   added lopressor 25mg bid /  continue  norvasc  will do CT chest for evaluation of severity of CHF  avoidACEi/ARB NSAIDS due to CKD

## 2018-09-14 NOTE — CONSULT NOTE ADULT - ASSESSMENT
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     1. HTN- pt on amlodipine and clonidine as outpt. SBP as inpt now in 150-170s systolic.   Will add lopressor 25mg BID today and follow. Will slowly normalize BP. Check 2Decho   to assess for CMP. Will continue to avoid ace/arb for now.     2. CHF- LE edema- may be due to CHF (unknown EF) vs amlodipine.   Can continue IV lasix another 24hrs and then consider changing to PO. Cr 1.44  today- uncertain of baseline. May need CT chest as well.   Strict I/Os, daily wts. Lyndsey adjust diuretic regimen accordingly.     3. Hypothyroidism- cont synthroid.     4. DVT proph, replete lytes as needed.     5. CRI- uncertain if acute component. Trend Cr daily. Avoid NSAIDs, ace, arb.

## 2018-09-14 NOTE — CONSULT NOTE ADULT - SUBJECTIVE AND OBJECTIVE BOX
Cardiology Consultation    HPI: 89 y/o female with a PMHx HTN presents to the ED regarding high blood pressure today. Pt was at her home and was found to have BP of 191/96. Pt then received Amlodipine 10mg, Clonidine 0.1mg PO and the repeat BP was 184/76, so was sent to ED by visiting nurse. +bilateral LE swelling, at baseline. No HA, dizziness, chest pain, SOB or any other acute complaint at this time. Pt was discharged from [facility]'s rehab yesterday for fractured rib.     9/14- No CP/SOB. Poor historian. Confused. SR on tele.     PSHx:  No recent surgeries    Family hx:  Patient is unable to verify any family history due to her clinica status- dementia (13 Sep 2018 23:04)    PAST MEDICAL & SURGICAL HISTORY:  HTN  Dementia    Allergies  oxycodone (Other (Moderate))  OxyContin (Unknown)    SOCIAL HISTORY: Denies tobacco, etoh abuse or illicit drug use    FAMILY HISTORY: No pertinent family history in first degree relatives    MEDICATIONS  (STANDING):  amLODIPine   Tablet 10 milliGRAM(s) Oral daily  aspirin enteric coated 81 milliGRAM(s) Oral daily  cloNIDine 0.2 milliGRAM(s) Oral two times a day  donepezil 5 milliGRAM(s) Oral at bedtime  ferrous    sulfate 325 milliGRAM(s) Oral two times a day  furosemide   Injectable 40 milliGRAM(s) IV Push daily  levothyroxine 50 MICROGram(s) Oral daily  pantoprazole    Tablet 40 milliGRAM(s) Oral before breakfast  sertraline 25 milliGRAM(s) Oral daily    MEDICATIONS  (PRN):  acetaminophen   Tablet .. 650 milliGRAM(s) Oral every 8 hours PRN Temp greater or equal to 38C (100.4F), Mild Pain (1 - 3)  oxyCODONE    5 mG/acetaminophen 325 mG 1 Tablet(s) Oral every 6 hours PRN Moderate Pain (4 - 6)      Vital Signs Last 24 Hrs  T(C): 36.9 (14 Sep 2018 04:14), Max: 37.3 (13 Sep 2018 15:36)  T(F): 98.4 (14 Sep 2018 04:14), Max: 99.2 (13 Sep 2018 15:36)  HR: 100 (14 Sep 2018 04:14) (86 - 130)  BP: 174/51 (14 Sep 2018 04:14) (152/94 - 211/92)  BP(mean): 99 (14 Sep 2018 00:28) (99 - 112)  RR: 17 (14 Sep 2018 04:14) (15 - 18)  SpO2: 96% (14 Sep 2018 04:14) (95% - 96%)    REVIEW OF SYSTEMS:    CONSTITUTIONAL:  As per HPI. Confused.  HEENT:  Eyes:  No diplopia or blurred vision. ENT:  No earache, sore throat or runny nose.  CARDIOVASCULAR:  No pressure, squeezing, strangling, tightness, heaviness or aching about the chest, neck, axilla or epigastrium.  RESPIRATORY:  No cough, shortness of breath, PND or orthopnea.  GASTROINTESTINAL:  No nausea, vomiting or diarrhea.  GENITOURINARY:  No dysuria, frequency or urgency.  MUSCULOSKELETAL:  As per HPI.  SKIN:  No change in skin, hair or nails.  NEUROLOGIC:  No paresthesias, fasciculations, seizures or weakness.    PHYSICAL EXAMINATION:    GENERAL APPEARANCE:  Pt. is not currently dyspneic, in no distress. Pt. is alert, oriented, and pleasant.  HEENT:  Pupils are normal and react normally. No icterus. Mucous membranes well colored.  NECK:  Supple. No lymphadenopathy. Jugular venous pressure not elevated. Carotids equal.   HEART:   The cardiac impulse has a normal quality. There are no murmurs, rubs or gallops noted  CHEST:  Chest is clear to auscultation. Normal respiratory effort.  ABDOMEN:  Soft and nontender.   EXTREMITIES:  There is no edema.     I&O's Summary    LABS:                        10.1   6.30  )-----------( 195      ( 14 Sep 2018 05:15 )             30.8     09-14    144  |  113<H>  |  34<H>  ----------------------------<  99  3.9   |  19<L>  |  1.44<H>    Ca    9.8      14 Sep 2018 05:15    TPro  8.6<H>  /  Alb  4.3  /  TBili  0.4  /  DBili  x   /  AST  17  /  ALT  19  /  AlkPhos  77  09-13    LIVER FUNCTIONS - ( 13 Sep 2018 17:08 )  Alb: 4.3 g/dL / Pro: 8.6 gm/dL / ALK PHOS: 77 U/L / ALT: 19 U/L / AST: 17 U/L / GGT: x           CARDIAC MARKERS ( 13 Sep 2018 21:27 )  0.034 ng/mL / x     / x     / x     / x      CARDIAC MARKERS ( 13 Sep 2018 17:08 )  0.038 ng/mL / x     / x     / x     / x        EKG: SR @ 95 APCs/PVCs. No acute ST changes.     TELEMETRY: SR    CARDIAC TESTS: pending    RADIOLOGY & ADDITIONAL STUDIES: LE U/S- negative for DVT.    ASSESSMENT & PLAN:

## 2018-09-14 NOTE — PHYSICAL THERAPY INITIAL EVALUATION ADULT - PERTINENT HX OF CURRENT PROBLEM, REHAB EVAL
87 yo F admitted with elevated blood pressure. Pt was at her home and was found to have BP of 191/96 by visiting nurse.  Pt was discharged from [facility]'s rehab one day prior to admit for fractured rib.  US (-) for DVT,

## 2018-09-14 NOTE — PHYSICAL THERAPY INITIAL EVALUATION ADULT - ACTIVE RANGE OF MOTION EXAMINATION, REHAB EVAL
bilateral upper extremity Active ROM was WFL (within functional limits)/bilateral  lower extremity Active ROM was WFL (within functional limits)/B shoulders to 90*, c/o pain with moving R UE.

## 2018-09-14 NOTE — PHYSICAL THERAPY INITIAL EVALUATION ADULT - ADDITIONAL COMMENTS
Patient reported she was independent in ADL, and daughter home with her at all times. Poor historian.

## 2018-09-15 LAB
ANION GAP SERPL CALC-SCNC: 9 MMOL/L — SIGNIFICANT CHANGE UP (ref 5–17)
BASOPHILS # BLD AUTO: 0.03 K/UL — SIGNIFICANT CHANGE UP (ref 0–0.2)
BASOPHILS NFR BLD AUTO: 0.5 % — SIGNIFICANT CHANGE UP (ref 0–2)
BUN SERPL-MCNC: 50 MG/DL — HIGH (ref 7–23)
CALCIUM SERPL-MCNC: 9.2 MG/DL — SIGNIFICANT CHANGE UP (ref 8.5–10.1)
CHLORIDE SERPL-SCNC: 109 MMOL/L — HIGH (ref 96–108)
CO2 SERPL-SCNC: 23 MMOL/L — SIGNIFICANT CHANGE UP (ref 22–31)
CREAT SERPL-MCNC: 2.09 MG/DL — HIGH (ref 0.5–1.3)
EOSINOPHIL # BLD AUTO: 0.36 K/UL — SIGNIFICANT CHANGE UP (ref 0–0.5)
EOSINOPHIL NFR BLD AUTO: 5.4 % — SIGNIFICANT CHANGE UP (ref 0–6)
GLUCOSE SERPL-MCNC: 78 MG/DL — SIGNIFICANT CHANGE UP (ref 70–99)
HCT VFR BLD CALC: 32.6 % — LOW (ref 34.5–45)
HGB BLD-MCNC: 10.6 G/DL — LOW (ref 11.5–15.5)
IMM GRANULOCYTES NFR BLD AUTO: 0.5 % — SIGNIFICANT CHANGE UP (ref 0–1.5)
LYMPHOCYTES # BLD AUTO: 0.9 K/UL — LOW (ref 1–3.3)
LYMPHOCYTES # BLD AUTO: 13.6 % — SIGNIFICANT CHANGE UP (ref 13–44)
MCHC RBC-ENTMCNC: 31.9 PG — SIGNIFICANT CHANGE UP (ref 27–34)
MCHC RBC-ENTMCNC: 32.5 GM/DL — SIGNIFICANT CHANGE UP (ref 32–36)
MCV RBC AUTO: 98.2 FL — SIGNIFICANT CHANGE UP (ref 80–100)
MONOCYTES # BLD AUTO: 0.63 K/UL — SIGNIFICANT CHANGE UP (ref 0–0.9)
MONOCYTES NFR BLD AUTO: 9.5 % — SIGNIFICANT CHANGE UP (ref 2–14)
NEUTROPHILS # BLD AUTO: 4.67 K/UL — SIGNIFICANT CHANGE UP (ref 1.8–7.4)
NEUTROPHILS NFR BLD AUTO: 70.5 % — SIGNIFICANT CHANGE UP (ref 43–77)
NRBC # BLD: 0 /100 WBCS — SIGNIFICANT CHANGE UP (ref 0–0)
PLATELET # BLD AUTO: 225 K/UL — SIGNIFICANT CHANGE UP (ref 150–400)
POTASSIUM SERPL-MCNC: 3.6 MMOL/L — SIGNIFICANT CHANGE UP (ref 3.5–5.3)
POTASSIUM SERPL-SCNC: 3.6 MMOL/L — SIGNIFICANT CHANGE UP (ref 3.5–5.3)
RBC # BLD: 3.32 M/UL — LOW (ref 3.8–5.2)
RBC # FLD: 13.9 % — SIGNIFICANT CHANGE UP (ref 10.3–14.5)
SODIUM SERPL-SCNC: 141 MMOL/L — SIGNIFICANT CHANGE UP (ref 135–145)
WBC # BLD: 6.62 K/UL — SIGNIFICANT CHANGE UP (ref 3.8–10.5)
WBC # FLD AUTO: 6.62 K/UL — SIGNIFICANT CHANGE UP (ref 3.8–10.5)

## 2018-09-15 PROCEDURE — 71250 CT THORAX DX C-: CPT | Mod: 26

## 2018-09-15 RX ORDER — HYDRALAZINE HCL 50 MG
10 TABLET ORAL EVERY 8 HOURS
Qty: 0 | Refills: 0 | Status: DISCONTINUED | OUTPATIENT
Start: 2018-09-15 | End: 2018-09-17

## 2018-09-15 RX ADMIN — AMLODIPINE BESYLATE 10 MILLIGRAM(S): 2.5 TABLET ORAL at 05:25

## 2018-09-15 RX ADMIN — DONEPEZIL HYDROCHLORIDE 5 MILLIGRAM(S): 10 TABLET, FILM COATED ORAL at 21:46

## 2018-09-15 RX ADMIN — Medication 25 MILLIGRAM(S): at 05:25

## 2018-09-15 RX ADMIN — SERTRALINE 25 MILLIGRAM(S): 25 TABLET, FILM COATED ORAL at 12:31

## 2018-09-15 RX ADMIN — Medication 25 MILLIGRAM(S): at 17:36

## 2018-09-15 RX ADMIN — Medication 50 MICROGRAM(S): at 05:25

## 2018-09-15 RX ADMIN — Medication 81 MILLIGRAM(S): at 12:30

## 2018-09-15 RX ADMIN — Medication 325 MILLIGRAM(S): at 05:25

## 2018-09-15 RX ADMIN — Medication 325 MILLIGRAM(S): at 17:36

## 2018-09-15 RX ADMIN — Medication 0.2 MILLIGRAM(S): at 05:25

## 2018-09-15 RX ADMIN — Medication 40 MILLIGRAM(S): at 05:25

## 2018-09-15 RX ADMIN — Medication 0.2 MILLIGRAM(S): at 17:36

## 2018-09-15 RX ADMIN — PANTOPRAZOLE SODIUM 40 MILLIGRAM(S): 20 TABLET, DELAYED RELEASE ORAL at 05:25

## 2018-09-15 NOTE — PROGRESS NOTE ADULT - ASSESSMENT
· Assessment		  Hypertensive urgency /doubt new onset CHF/initially treated as acute new onset chf  - BP better controlled ('s- 160's)  s/p IV lasix - now discontinued due to MELISSA   continue /  home dose clonidine 0.2 bid /   added lopressor 25mg bid  for improving BP control/  continue  norvasc  CT chest -no PNA ,no CHF  - TTE--> mild AR / normal LVEF  - appreciate cardiology consult   -daily weight / I & O's  avoidACEi/ARB NSAIDS due to CKD and now MELISSA    CKD stage 3 with chronic normal anion gap metabolic acidosis  - slightly increased creatinine on adm w/ slight downtrend today (1.47--> 1.44)  - con't to monitor    Hypothyroidism  -on levothyroxine    Dementia  -continue home meds    GERD  -on PPI    Chronic pain syndromes  -on oxycodoneSCD for DVT ppx   no AC due to hx of bleed in past    attending physician  Patient seen and examined with the NP. Agree with above plan of care which was discussed with the patient, family, team and NP.   necessary amendments to note were made as below  Hypertensive urgency /New onset CHF  - BP better controlled ('s- 160's)  - con't IV lasix 40 daily for 24 hrs then change to po   continue / clonidine 0.2 bid /   added lopressor 25mg bid /  continue  norvasc  will do CT chest for evaluation of severity of CHF  avoidACEi/ARB NSAIDS due to CKD · Assessment		  Hypertensive urgency /doubt new onset CHF/initially treated as acute new onset chf  - BP better controlled ('s- 160's)  s/p IV lasix - now discontinued due to MELISSA   continue /  home dose clonidine 0.2 bid /   added lopressor 25mg bid  for improving BP control/  would consider increasing dose of lopressor if needed for BP control and if HR allows  continue  norvasc  for now added hydralazine 10mg po TID prn for SBP>160  CT chest -no PNA ,no CHF  venous doppler negative for DVT bilateral lower extremities  - TTE--> mild AR / normal LVEF  - appreciate cardiology consult   -daily weight / I & O's  avoidACEi/ARB NSAIDS due to CKD and now MELISSA    #MELISSA /prerenal azotemia secondary to IV diuresis on CKD stage 3  CKD stage 3 with chronic normal anion gap metabolic acidosis  will discontinue lasix  monitor BMP  if cr not improved , then would even consider slow IVF and renal consult  - con't to monitor    Hypothyroidism  -on levothyroxine    Dementia  -continue home meds    GERD  -on PPI    Chronic pain syndromes  -on oxycodone    SCD for DVT ppx   no AC due to hx of bleed in past    disposition -discharge planning once MELISSA significantly improves · Assessment		  Hypertensive urgency /doubt new onset CHF/initially treated as acute new onset chf  - BP better controlled ('s- 160's)  s/p IV lasix - now discontinued due to MELISSA   continue /  home dose clonidine 0.2 bid /   added lopressor 25mg bid  for improving BP control/  would consider increasing dose of lopressor if needed for BP control and if HR allows  continue  norvasc  for now added hydralazine 10mg po TID prn for SBP>160  CT chest -no PNA ,no CHF  venous doppler negative for DVT bilateral lower extremities  - TTE--> mild AR / normal LVEF  - appreciate cardiology consult   -daily weight / I & O's  avoidACEi/ARB NSAIDS due to CKD and now MELISSA    #MELISSA /prerenal azotemia secondary to IV diuresis on CKD stage 3  CKD stage 3 with chronic normal anion gap metabolic acidosis  will discontinue lasix  monitor BMP  if cr not improved , then would even consider slow IVF and renal consult  - con't to monitor    Hypothyroidism  -on levothyroxine    Dementia  -continue home meds    GERD  -on PPI    Chronic pain syndromes  -on oxycodone    SCD for DVT ppx   no AC due to hx of bleed in past    disposition -discharge planning once MELISSA significantly improves  PT eval

## 2018-09-15 NOTE — PROGRESS NOTE ADULT - SUBJECTIVE AND OBJECTIVE BOX
· Subjective and Objective: 	    Chart and meds reviewed.  Patient seen and examined.    CC: Elevated BP    HPI: 87 y/o female with a PMHx HTN presents to the ED regarding high blood pressure today. Pt discharged fro [facility] rehab 1 day prior to her ED arrival and was undergoing VNS home assessment when she was found to have BP of 191/96. Pt then received Amlodipine 10mg, Clonidine 0.1mg PO and the repeat BP was 184/76, so was sent to ED by visiting nurse.     Subjective  9/14/18: feels "good" Denies CP/palpitation/SOB/HA/dizziness/abd pain/n/v/d/f/c; has chronic generalized pain; denies pain to legs.      REVIEW OF SYSTEM: ROS comprehensively negative except as above  PHYSICAL EXAM:  General:   HEENT:  pupils equal and reactive, EOMI, no oropharyngeal lesions, erythema, exudates, oral thrush  NECK:   supple, no carotid bruits, no palpable lymph nodes, no thyromegaly  CV:  +S1, +S2, regular, no murmurs or rubs  RESP:   lungs clear to auscultation bilaterally, no wheezing, rales, rhonchi, good air entry bilaterally  GI:  abdomen soft, non-tender, non-distended, normal BS, no bruits, no abdominal masses, no palpable masses  MSK:   normal muscle tone, no atrophy, no rigidity, no contractions  EXT:  trace pedal edema, no clubbing, no cyanosis,  no calf pain,erythema  VASCULAR:  pulses equal and symmetric in the upper and lower extremities  NEURO:  AAOX3, no focal neurological deficits, follows all commands, able to move extremities spontaneously  SKIN:  no ulcers, lesions or rashes · Subjective and Objective: 	    Chart and meds reviewed.  Patient seen and examined.    CC: Elevated BP    HPI: 87 y/o female with a PMHx HTN presents to the ED regarding high blood pressure today. Pt discharged fro [facility] rehab 1 day prior to her ED arrival and was undergoing VNS home assessment when she was found to have BP of 191/96. Pt then received Amlodipine 10mg, Clonidine 0.1mg PO and the repeat BP was 184/76, so was sent to ED by visiting nurse.     Subjective  9/14/18: feels "good" Denies CP/palpitation/SOB/HA/dizziness/abd pain/n/v/d/f/c; has chronic generalized pain; denies pain to legs.  9/15- denies SOB ,no chest pain, no complaints, voiding well        REVIEW OF SYSTEM: ROS comprehensively negative except as above  PHYSICAL EXAM:  General:   HEENT:  pupils equal and reactive, EOMI, no oropharyngeal lesions, erythema, exudates, oral thrush  NECK:   supple, no carotid bruits, no palpable lymph nodes, no thyromegaly  CV:  +S1, +S2, regular, no murmurs or rubs  RESP:   lungs clear to auscultation bilaterally, no wheezing, rales, rhonchi, good air entry bilaterally  GI:  abdomen soft, non-tender, non-distended, normal BS, no bruits, no abdominal masses, no palpable masses  MSK:   normal muscle tone, no atrophy, no rigidity, no contractions  EXT:  trace pedal edema, no clubbing, no cyanosis,  no calf pain,erythema  VASCULAR:  pulses equal and symmetric in the upper and lower extremities  NEURO:  AAOX3, no focal neurological deficits, follows all commands, able to move extremities spontaneously  SKIN:  no ulcers, lesions or rashes      PHYSICAL EXAM:    Daily     Daily     ICU Vital Signs Last 24 Hrs  T(C): 36.7 (15 Sep 2018 16:35), Max: 36.7 (15 Sep 2018 10:57)  T(F): 98.1 (15 Sep 2018 16:35), Max: 98.1 (15 Sep 2018 16:35)  HR: 79 (15 Sep 2018 16:35) (65 - 79)  BP: 157/63 (15 Sep 2018 16:35) (108/69 - 157/63)  BP(mean): --  ABP: --  ABP(mean): --  RR: 16 (15 Sep 2018 16:35) (16 - 17)  SpO2: 93% (15 Sep 2018 16:35) (91% - 94%)                                10.6   6.62  )-----------( 225      ( 15 Sep 2018 11:09 )             32.6       CBC Full  -  ( 15 Sep 2018 11:09 )  WBC Count : 6.62 K/uL  Hemoglobin : 10.6 g/dL  Hematocrit : 32.6 %  Platelet Count - Automated : 225 K/uL  Mean Cell Volume : 98.2 fl  Mean Cell Hemoglobin : 31.9 pg  Mean Cell Hemoglobin Concentration : 32.5 gm/dL  Auto Neutrophil # : 4.67 K/uL  Auto Lymphocyte # : 0.90 K/uL  Auto Monocyte # : 0.63 K/uL  Auto Eosinophil # : 0.36 K/uL  Auto Basophil # : 0.03 K/uL  Auto Neutrophil % : 70.5 %  Auto Lymphocyte % : 13.6 %  Auto Monocyte % : 9.5 %  Auto Eosinophil % : 5.4 %  Auto Basophil % : 0.5 %      09-15    141  |  109<H>  |  50<H>  ----------------------------<  78  3.6   |  23  |  2.09<H>    Ca    9.2      15 Sep 2018 11:09    TPro  8.6<H>  /  Alb  4.3  /  TBili  0.4  /  DBili  x   /  AST  17  /  ALT  19  /  AlkPhos  77  09-13      LIVER FUNCTIONS - ( 13 Sep 2018 17:08 )  Alb: 4.3 g/dL / Pro: 8.6 gm/dL / ALK PHOS: 77 U/L / ALT: 19 U/L / AST: 17 U/L / GGT: x                 CARDIAC MARKERS ( 13 Sep 2018 21:27 )  0.034 ng/mL / x     / x     / x     / x      CARDIAC MARKERS ( 13 Sep 2018 17:08 )  0.038 ng/mL / x     / x     / x     / x                    MEDICATIONS  (STANDING):  amLODIPine   Tablet 10 milliGRAM(s) Oral daily  aspirin enteric coated 81 milliGRAM(s) Oral daily  cloNIDine 0.2 milliGRAM(s) Oral two times a day  donepezil 5 milliGRAM(s) Oral at bedtime  ferrous    sulfate 325 milliGRAM(s) Oral two times a day  levothyroxine 50 MICROGram(s) Oral daily  metoprolol tartrate 25 milliGRAM(s) Oral two times a day  pantoprazole    Tablet 40 milliGRAM(s) Oral before breakfast  sertraline 25 milliGRAM(s) Oral daily

## 2018-09-16 LAB
ANION GAP SERPL CALC-SCNC: 11 MMOL/L — SIGNIFICANT CHANGE UP (ref 5–17)
BUN SERPL-MCNC: 60 MG/DL — HIGH (ref 7–23)
CALCIUM SERPL-MCNC: 8.4 MG/DL — LOW (ref 8.5–10.1)
CHLORIDE SERPL-SCNC: 112 MMOL/L — HIGH (ref 96–108)
CO2 SERPL-SCNC: 21 MMOL/L — LOW (ref 22–31)
CREAT SERPL-MCNC: 1.94 MG/DL — HIGH (ref 0.5–1.3)
GLUCOSE SERPL-MCNC: 103 MG/DL — HIGH (ref 70–99)
POTASSIUM SERPL-MCNC: 3.7 MMOL/L — SIGNIFICANT CHANGE UP (ref 3.5–5.3)
POTASSIUM SERPL-SCNC: 3.7 MMOL/L — SIGNIFICANT CHANGE UP (ref 3.5–5.3)
SODIUM SERPL-SCNC: 144 MMOL/L — SIGNIFICANT CHANGE UP (ref 135–145)

## 2018-09-16 RX ADMIN — Medication 325 MILLIGRAM(S): at 05:57

## 2018-09-16 RX ADMIN — SERTRALINE 25 MILLIGRAM(S): 25 TABLET, FILM COATED ORAL at 12:08

## 2018-09-16 RX ADMIN — DONEPEZIL HYDROCHLORIDE 5 MILLIGRAM(S): 10 TABLET, FILM COATED ORAL at 21:16

## 2018-09-16 RX ADMIN — Medication 25 MILLIGRAM(S): at 18:07

## 2018-09-16 RX ADMIN — Medication 325 MILLIGRAM(S): at 18:07

## 2018-09-16 RX ADMIN — Medication 25 MILLIGRAM(S): at 05:58

## 2018-09-16 RX ADMIN — Medication 50 MICROGRAM(S): at 05:57

## 2018-09-16 RX ADMIN — Medication 0.2 MILLIGRAM(S): at 18:02

## 2018-09-16 RX ADMIN — Medication 81 MILLIGRAM(S): at 12:13

## 2018-09-16 RX ADMIN — PANTOPRAZOLE SODIUM 40 MILLIGRAM(S): 20 TABLET, DELAYED RELEASE ORAL at 05:58

## 2018-09-16 RX ADMIN — Medication 0.2 MILLIGRAM(S): at 05:57

## 2018-09-16 RX ADMIN — AMLODIPINE BESYLATE 10 MILLIGRAM(S): 2.5 TABLET ORAL at 05:57

## 2018-09-16 NOTE — PROGRESS NOTE ADULT - ASSESSMENT
uncontrolled HTN.  -BP improved.  -TTE LVEF 60-65%.  -amlodipine 10mg po qd.  -clonidine 0.2mg po bid.  -metoprolol 25mg po bid.  -hydralazine 10mg po q8h PRN.    MELISSA upon CKD.  -suspect HTN nephrosclerosis.  -favor Cr at baseline.  -09/16 Cr 1.94 (previously, raging from 1.22-1.99).  -BMP.  -UA.  -ultrasound.  -Nephrology consult.    HFpEF.  -TTE, reduced compliance of LV.  -BB.  -ACEI/ARB precluded at current time. uncontrolled HTN.  -telemetry reviewed, NSR.  -BP improved.  -TTE LVEF 60-65%.  -amlodipine 10mg po qd.  -clonidine 0.2mg po bid.  -metoprolol 25mg po bid.  -hydralazine 10mg po q8h PRN.    MELISSA upon CKD.  -suspect HTN nephrosclerosis.  -favor Cr at baseline.  -09/16 Cr 1.94 (previously, raging from 1.22-1.99).  -BMP.  -UA.  -ultrasound.  -Nephrology consult.    HFpEF.  -TTE, reduced compliance of LV.  -BB.  -ACEI/ARB precluded at current time.    disposition.  -d/c telemetry and transfer to the general medical cheek.    communication.  -RN.  -family. uncontrolled HTN.  -telemetry reviewed, NSR.  -BP improved.  -TTE LVEF 60-65%.  -amlodipine 10mg po qd.  -clonidine 0.2mg po bid.  -metoprolol 25mg po bid.  -hydralazine 10mg po q8h PRN.    MELISSA upon CKD.  -suspect HTN nephrosclerosis.  -favor Cr at baseline.  -09/16 Cr 1.94 (previously, raging from 1.22-1.99).  -BMP.  -UA.  -ultrasound.  -Nephrology consult.    HFpEF.  -TTE, reduced compliance of LV.  -BB.  -ACEI/ARB precluded at current time.    disposition.  -d/c telemetry and transfer to the general medical cheek.    communication.  -RN.  -family.  -patient follow w/ Dr. Hernández and requesting him to consult.  d/w Dr. Hutchins.

## 2018-09-16 NOTE — PROGRESS NOTE ADULT - SUBJECTIVE AND OBJECTIVE BOX
CC:  Patient is a 88y old  Female who presents with a chief complaint of complain of uncontrolled BP (16 Sep 2018 13:35)    SUBJECTIVE:  family at bedside.  no complaints.    ROS:  all other review of systems are negative unless indicated above.    acetaminophen   Tablet .. 650 milliGRAM(s) Oral every 8 hours PRN  amLODIPine   Tablet 10 milliGRAM(s) Oral daily  aspirin enteric coated 81 milliGRAM(s) Oral daily  cloNIDine 0.2 milliGRAM(s) Oral two times a day  donepezil 5 milliGRAM(s) Oral at bedtime  ferrous    sulfate 325 milliGRAM(s) Oral two times a day  hydrALAZINE 10 milliGRAM(s) Oral every 8 hours PRN  levothyroxine 50 MICROGram(s) Oral daily  metoprolol tartrate 25 milliGRAM(s) Oral two times a day  oxyCODONE    5 mG/acetaminophen 325 mG 1 Tablet(s) Oral every 6 hours PRN  pantoprazole    Tablet 40 milliGRAM(s) Oral before breakfast  sertraline 25 milliGRAM(s) Oral daily    T(C): 36.6 (09-16-18 @ 10:57), Max: 36.7 (09-15-18 @ 16:35)  HR: 69 (09-16-18 @ 10:57) (61 - 79)  BP: 120/53 (09-16-18 @ 10:57) (120/53 - 157/63)  RR: 19 (09-16-18 @ 10:57) (16 - 19)  SpO2: 98% (09-16-18 @ 10:57) (93% - 98%)    general-elderly wf no acute distress.  NCAT.  neck-JVP wnl.  lungs-clear.  heart-regular.  abd-soft.  NT.  ND.  ext-no pedal edema.                        10.6   6.62  )-----------( 225      ( 15 Sep 2018 11:09 )             32.6       09-16    144  |  112<H>  |  60<H>  ----------------------------<  103<H>  3.7   |  21<L>  |  1.94<H>    Ca    8.4<L>      16 Sep 2018 06:47

## 2018-09-16 NOTE — PROGRESS NOTE ADULT - SUBJECTIVE AND OBJECTIVE BOX
Cardiology Consultation    HPI: 87 y/o female with a PMHx HTN presents to the ED regarding high blood pressure today. Pt was at her home and was found to have BP of 191/96. Pt then received Amlodipine 10mg, Clonidine 0.1mg PO and the repeat BP was 184/76, so was sent to ED by visiting nurse. +bilateral LE swelling, at baseline. No HA, dizziness, chest pain, SOB or any other acute complaint at this time. Pt was discharged from [facility]'s rehab yesterday for fractured rib.     9/14- No CP/SOB. Poor historian. Confused. SR on tele.     9/16- pt seen and examined by me today. she denies any symptoms. states that her BP always runs high.     PSHx:  No recent surgeries    Family hx:  Patient is unable to verify any family history due to her clinica status- dementia (13 Sep 2018 23:04)    PAST MEDICAL & SURGICAL HISTORY:  HTN  Dementia    Allergies  oxycodone (Other (Moderate))  OxyContin (Unknown)    SOCIAL HISTORY: Denies tobacco, etoh abuse or illicit drug use    FAMILY HISTORY: No pertinent family history in first degree relatives    MEDICATIONS  (STANDING):  amLODIPine   Tablet 10 milliGRAM(s) Oral daily  aspirin enteric coated 81 milliGRAM(s) Oral daily  cloNIDine 0.2 milliGRAM(s) Oral two times a day  donepezil 5 milliGRAM(s) Oral at bedtime  ferrous    sulfate 325 milliGRAM(s) Oral two times a day  furosemide   Injectable 40 milliGRAM(s) IV Push daily  levothyroxine 50 MICROGram(s) Oral daily  pantoprazole    Tablet 40 milliGRAM(s) Oral before breakfast  sertraline 25 milliGRAM(s) Oral daily    MEDICATIONS  (PRN):  acetaminophen   Tablet .. 650 milliGRAM(s) Oral every 8 hours PRN Temp greater or equal to 38C (100.4F), Mild Pain (1 - 3)  oxyCODONE    5 mG/acetaminophen 325 mG 1 Tablet(s) Oral every 6 hours PRN Moderate Pain (4 - 6)      Vital Signs Last 24 Hrs  T(C): 36.9 (14 Sep 2018 04:14), Max: 37.3 (13 Sep 2018 15:36)  T(F): 98.4 (14 Sep 2018 04:14), Max: 99.2 (13 Sep 2018 15:36)  HR: 100 (14 Sep 2018 04:14) (86 - 130)  BP: 174/51 (14 Sep 2018 04:14) (152/94 - 211/92)  BP(mean): 99 (14 Sep 2018 00:28) (99 - 112)  RR: 17 (14 Sep 2018 04:14) (15 - 18)  SpO2: 96% (14 Sep 2018 04:14) (95% - 96%)    REVIEW OF SYSTEMS:    CONSTITUTIONAL:  As per HPI. Confused.  HEENT:  Eyes:  No diplopia or blurred vision. ENT:  No earache, sore throat or runny nose.  CARDIOVASCULAR:  No pressure, squeezing, strangling, tightness, heaviness or aching about the chest, neck, axilla or epigastrium.  RESPIRATORY:  No cough, shortness of breath, PND or orthopnea.  GASTROINTESTINAL:  No nausea, vomiting or diarrhea.  GENITOURINARY:  No dysuria, frequency or urgency.  MUSCULOSKELETAL:  As per HPI.  SKIN:  No change in skin, hair or nails.  NEUROLOGIC:  No paresthesias, fasciculations, seizures or weakness.    PHYSICAL EXAMINATION:    GENERAL APPEARANCE:  Pt. is not currently dyspneic, in no distress. Pt. is alert, oriented, and pleasant.  HEENT:  Pupils are normal and react normally. No icterus. Mucous membranes well colored.  NECK:  Supple. No lymphadenopathy. Jugular venous pressure not elevated. Carotids equal.   HEART:   The cardiac impulse has a normal quality. There are no murmurs, rubs or gallops noted  CHEST:  Chest is clear to auscultation. Normal respiratory effort.  ABDOMEN:  Soft and nontender.   EXTREMITIES:  There is no edema.     I&O's Summary    LABS:                        10.1   6.30  )-----------( 195      ( 14 Sep 2018 05:15 )             30.8     09-14    144  |  113<H>  |  34<H>  ----------------------------<  99  3.9   |  19<L>  |  1.44<H>    Ca    9.8      14 Sep 2018 05:15    TPro  8.6<H>  /  Alb  4.3  /  TBili  0.4  /  DBili  x   /  AST  17  /  ALT  19  /  AlkPhos  77  09-13    LIVER FUNCTIONS - ( 13 Sep 2018 17:08 )  Alb: 4.3 g/dL / Pro: 8.6 gm/dL / ALK PHOS: 77 U/L / ALT: 19 U/L / AST: 17 U/L / GGT: x           CARDIAC MARKERS ( 13 Sep 2018 21:27 )  0.034 ng/mL / x     / x     / x     / x      CARDIAC MARKERS ( 13 Sep 2018 17:08 )  0.038 ng/mL / x     / x     / x     / x        EKG: SR @ 95 APCs/PVCs. No acute ST changes.     TELEMETRY: SR

## 2018-09-16 NOTE — PROGRESS NOTE ADULT - ASSESSMENT
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     1. HTN- BP well controlled this am.  Continue current meds.     2. CHF- LE edema- hold diuretics with fluctuating renal function.     3. Hypothyroidism- cont synthroid.     4. DVT proph, replete lytes as needed.

## 2018-09-17 ENCOUNTER — TRANSCRIPTION ENCOUNTER (OUTPATIENT)
Age: 83
End: 2018-09-17

## 2018-09-17 VITALS — WEIGHT: 152.56 LBS

## 2018-09-17 LAB
ANION GAP SERPL CALC-SCNC: 11 MMOL/L — SIGNIFICANT CHANGE UP (ref 5–17)
BUN SERPL-MCNC: 65 MG/DL — HIGH (ref 7–23)
CALCIUM SERPL-MCNC: 8.2 MG/DL — LOW (ref 8.5–10.1)
CHLORIDE SERPL-SCNC: 112 MMOL/L — HIGH (ref 96–108)
CO2 SERPL-SCNC: 21 MMOL/L — LOW (ref 22–31)
CREAT SERPL-MCNC: 1.72 MG/DL — HIGH (ref 0.5–1.3)
GLUCOSE SERPL-MCNC: 108 MG/DL — HIGH (ref 70–99)
POTASSIUM SERPL-MCNC: 3.9 MMOL/L — SIGNIFICANT CHANGE UP (ref 3.5–5.3)
POTASSIUM SERPL-SCNC: 3.9 MMOL/L — SIGNIFICANT CHANGE UP (ref 3.5–5.3)
SODIUM SERPL-SCNC: 144 MMOL/L — SIGNIFICANT CHANGE UP (ref 135–145)

## 2018-09-17 PROCEDURE — 76770 US EXAM ABDO BACK WALL COMP: CPT | Mod: 26

## 2018-09-17 RX ORDER — METOPROLOL TARTRATE 50 MG
1 TABLET ORAL
Qty: 60 | Refills: 0 | OUTPATIENT
Start: 2018-09-17 | End: 2018-10-16

## 2018-09-17 RX ORDER — POTASSIUM CHLORIDE 20 MEQ
1 PACKET (EA) ORAL
Qty: 0 | Refills: 0 | COMMUNITY

## 2018-09-17 RX ADMIN — Medication 81 MILLIGRAM(S): at 12:43

## 2018-09-17 RX ADMIN — Medication 50 MICROGRAM(S): at 05:32

## 2018-09-17 RX ADMIN — SERTRALINE 25 MILLIGRAM(S): 25 TABLET, FILM COATED ORAL at 12:43

## 2018-09-17 RX ADMIN — Medication 325 MILLIGRAM(S): at 05:32

## 2018-09-17 RX ADMIN — Medication 25 MILLIGRAM(S): at 05:32

## 2018-09-17 RX ADMIN — AMLODIPINE BESYLATE 10 MILLIGRAM(S): 2.5 TABLET ORAL at 05:32

## 2018-09-17 RX ADMIN — PANTOPRAZOLE SODIUM 40 MILLIGRAM(S): 20 TABLET, DELAYED RELEASE ORAL at 05:32

## 2018-09-17 RX ADMIN — Medication 0.2 MILLIGRAM(S): at 05:32

## 2018-09-17 NOTE — DISCHARGE NOTE ADULT - CARE PROVIDER_API CALL
Horace Hdz), Internal Medicine; Pulmonary Disease  [address] [place] [address]  Phone: [phone]  Fax: [phone]    Jorge Luis Hernández), Cardiovascular Disease; Internal Medicine  [address] [facility] [facility] [address]  [place] [address]  Phone: [phone]  Fax: [phone] Horace Hdz), Internal Medicine; Pulmonary Disease  [address] [place] [address]  Phone: [phone]  Fax: [phone]    Jorge Luis Hernández (MD), Cardiovascular Disease; Internal Medicine  [address] [facility]  [address]  [place] [address]  Phone: [phone]  Fax: [phone]    Yun, SukHyeon (MD), Nephrology  [address] [facility] [address]  [place] [address]  Phone: [phone]  Fax: [phone]

## 2018-09-17 NOTE — DISCHARGE NOTE ADULT - MEDICATION SUMMARY - MEDICATIONS TO STOP TAKING
I will STOP taking the medications listed below when I get home from the hospital:  None I will STOP taking the medications listed below when I get home from the hospital:    potassium chloride 20 mEq oral tablet, extended release  -- 1 tab(s) by mouth once a day

## 2018-09-17 NOTE — DISCHARGE NOTE ADULT - PLAN OF CARE
Controlled blood pressure  150/90 or less Take your medications as prescribed  low salt diet  follow up with your cardiologist or primary care physician within 1 week  BP monitoring with home care agency [facility] follow up with your primary care provider follow up with your nephrologist within 1 week

## 2018-09-17 NOTE — DISCHARGE NOTE ADULT - MEDICATION SUMMARY - MEDICATIONS TO TAKE
I will START or STAY ON the medications listed below when I get home from the hospital:    aspirin 81 mg oral tablet  -- 1 tab(s) by mouth once a day  -- Indication: For preventative    acetaminophen 325 mg oral tablet  -- 2 tab(s) by mouth every 6 hours, As needed, pain or temp > 100.4  -- Indication: For Chronic pain    oxyCODONE-acetaminophen 5 mg-325 mg oral tablet  -- 1 tab(s) by mouth every 6 hours, As needed, Moderate Pain (4 - 6)  -- Indication: For Chronic pain    cloNIDine 0.2 mg oral tablet  -- 1 tab(s) by mouth 2 times a day  -- Indication: For HTN (hypertension)    sertraline 50 mg oral tablet  -- 1 tab(s) by mouth once a day  -- Indication: For depression    metoprolol tartrate 25 mg oral tablet  -- 1 tab(s) by mouth 2 times a day  -- Indication: For HTN (hypertension)    amLODIPine 10 mg oral tablet  -- 1 tab(s) by mouth once a day  -- Indication: For HTN (hypertension)    donepezil 5 mg oral tablet  -- 1 tab(s) by mouth once a day (at bedtime)  -- Indication: For dementia    ferrous sulfate 325 mg (65 mg elemental iron) oral tablet  -- 1 tab(s) by mouth 2 times a day  -- Indication: For supplement    pantoprazole 40 mg oral delayed release tablet  -- 1 tab(s) by mouth once a day  -- Indication: For preventative    levothyroxine 50 mcg (0.05 mg) oral tablet  -- 1 tab(s) by mouth once a day  -- Indication: For HYPOTHYROID    Vitamin D3 2000 intl units oral tablet  -- 1 tab(s) by mouth once a day  -- Indication: For supplement    ascorbic acid 250 mg oral tablet  -- 1 tab(s) by mouth 2 times a day  -- Indication: For supplement I will START or STAY ON the medications listed below when I get home from the hospital:    aspirin 81 mg oral tablet  -- 1 tab(s) by mouth once a day  -- Indication: For HTN (hypertension)    acetaminophen 325 mg oral tablet  -- 2 tab(s) by mouth every 6 hours, As needed, pain or temp > 100.4  -- Indication: For Chronic pain    oxyCODONE-acetaminophen 5 mg-325 mg oral tablet  -- 1 tab(s) by mouth every 6 hours, As needed, Moderate Pain (4 - 6)  -- Indication: For Chronic pain    cloNIDine 0.2 mg oral tablet  -- 1 tab(s) by mouth 2 times a day  -- Indication: For HTN (hypertension)    sertraline 50 mg oral tablet  -- 1 tab(s) by mouth once a day  -- Indication: For depression    metoprolol tartrate 25 mg oral tablet  -- 1 tab(s) by mouth 2 times a day  -- Indication: For HTN (hypertension)    amLODIPine 10 mg oral tablet  -- 1 tab(s) by mouth once a day  -- Indication: For HTN (hypertension)    donepezil 5 mg oral tablet  -- 1 tab(s) by mouth once a day (at bedtime)  -- Indication: For dementia    ferrous sulfate 325 mg (65 mg elemental iron) oral tablet  -- 1 tab(s) by mouth 2 times a day  -- Indication: For supplement    pantoprazole 40 mg oral delayed release tablet  -- 1 tab(s) by mouth once a day  -- Indication: For preventative    levothyroxine 50 mcg (0.05 mg) oral tablet  -- 1 tab(s) by mouth once a day  -- Indication: For thyroid    Vitamin D3 2000 intl units oral tablet  -- 1 tab(s) by mouth once a day  -- Indication: For supplement    ascorbic acid 250 mg oral tablet  -- 1 tab(s) by mouth 2 times a day  -- Indication: For supplement

## 2018-09-17 NOTE — DISCHARGE NOTE ADULT - PATIENT PORTAL LINK FT
You can access the [facility], offered by [facility], by registering with the following website: [email or web address]

## 2018-09-17 NOTE — PROGRESS NOTE ADULT - SUBJECTIVE AND OBJECTIVE BOX
called for Nephro consult - pt does not recall but previous nephro but records  show pt seen by Dr Camacho in July    pt being discharged today     advised hospitalist NP

## 2018-09-17 NOTE — DISCHARGE NOTE ADULT - HOSPITAL COURSE
89 y/o female with a PMHx HTN presents to the ED regarding high blood pressure today. Pt discharged fro [facility] rehab 1 day prior to her ED arrival and was undergoing VNS home assessment when she was found to have BP of 191/96. Pt then received Amlodipine 10mg, Clonidine 0.1mg PO and the repeat BP was 184/76, so was sent to ED by visiting nurse. She was admitted and treated. She was optimized, b/p improved and remain stable for discharge. Pt educated on the post discharge meds, follow up and procedure care. She / He is to make an annelise't to f/u with PCP within 1 week. Pt/familyverbalized an understanding of and agreed with discharge plans. DC summary faxed to pt's PCP and cardiologist (Dr Hernández).    Subjective:  was "tired" early am because didn't sleep much overnight d/t noisy room-mate; OOB to rm chair and feels better now; Denies CP/palpitation/SOB/HA/dizziness/abd pain/n/v/d/f/c    PHYSICAL EXAM  General: nad  HEENT:  pupils equal and reactive, EOMI, no oropharyngeal lesions, erythema, exudates, oral thrush  NECK:   supple, no carotid bruits, no palpable lymph nodes, no thyromegaly  CV:  +S1, +S2, regular, no murmurs or rubs  RESP:   lungs clear to auscultation bilaterally, no wheezing, rales, rhonchi, good air entry bilaterally  GI:  abdomen soft, non-tender, non-distended, normal BS, no bruits, no abdominal masses, no palpable masses  MSK:   normal muscle tone, no atrophy, no rigidity, no contractions  EXT:  trace pedal edema, no clubbing, no cyanosis,  no calf pain, erythema  VASCULAR:  pulses equal and symmetric in the upper and lower extremities  NEURO:  AAOX2-3, no focal neurological deficits, follows all commands, able to move extremities spontaneously  SKIN:  no ulcers, lesions or rashes    Vital Signs Last 24 Hrs  T(C): 36.3 (17 Sep 2018 11:47), Max: 36.6 (17 Sep 2018 04:58)  T(F): 97.4 (17 Sep 2018 11:47), Max: 97.8 (17 Sep 2018 04:58)  HR: 55 (17 Sep 2018 11:47) (55 - 75)  BP: 136/46 (17 Sep 2018 11:47) (136/46 - 157/47)  BP(mean): --  RR: 17 (17 Sep 2018 11:47) (17 - 18)  SpO2: 94% (17 Sep 2018 11:47) (94% - 97%)    A/P  Hypertensive urgency   -  initially treated as acute new onset CHF but doubt CHF  - BP better controlled ('s)  - s/p IV lasix transitioned to PO  - con't home dose clonidine 0.2 bid / norvasc   - added lopressor 25mg bid this adm optimal BP   - CT chest devoid of PNA or CHF  - TTE--> mild AR / normal LVEF  - appreciate cardiology consult   - avoid ACEi/ARB NSAIDS due to CKD    MELISSA on CKD stage 3 with chronic normal anion gap - gradual downtrend in crt  - renal US c/w chronic disease (more than likely HTN)    Hypothyroidism  -on levothyroxine    Dementia  -continue home meds    GERD  -on PPI    Chronic pain syndromes  - outpt management    SCD for DVT ppx   no AC due to hx of bleed in past 89 y/o female with a PMHx HTN presents to the ED regarding high blood pressure today. Pt discharged fro [facility] rehab 1 day prior to her ED arrival and was undergoing VNS home assessment when she was found to have BP of 191/96. Pt then received Amlodipine 10mg, Clonidine 0.1mg PO and the repeat BP was 184/76, so was sent to ED by visiting nurse. She was admitted and treated. She was optimized, b/p improved and remain stable for discharge. Pt educated on the post discharge meds, follow up and procedure care. She / He is to make an annelise't to f/u with PCP within 1 week. Pt/familyverbalized an understanding of and agreed with discharge plans. DC summary faxed to pt's PCP and cardiologist (Dr Hernández).    Subjective:  was "tired" early am because didn't sleep much overnight d/t noisy room-mate; OOB to rm chair and feels better now; Denies CP/palpitation/SOB/HA/dizziness/abd pain/n/v/d/f/c    PHYSICAL EXAM  General: nad  HEENT:  pupils equal and reactive, EOMI, no oropharyngeal lesions, erythema, exudates, oral thrush  NECK:   supple, no carotid bruits, no palpable lymph nodes, no thyromegaly  CV:  +S1, +S2, regular, no murmurs or rubs  RESP:   lungs clear to auscultation bilaterally, no wheezing, rales, rhonchi, good air entry bilaterally  GI:  abdomen soft, non-tender, non-distended, normal BS, no bruits, no abdominal masses, no palpable masses  MSK:   normal muscle tone, no atrophy, no rigidity, no contractions  EXT:  trace pedal edema, no clubbing, no cyanosis,  no calf pain, erythema  VASCULAR:  pulses equal and symmetric in the upper and lower extremities  NEURO:  AAOX2-3, no focal neurological deficits, follows all commands, able to move extremities spontaneously  SKIN:  no ulcers, lesions or rashes    Vital Signs Last 24 Hrs  T(C): 36.3 (17 Sep 2018 11:47), Max: 36.6 (17 Sep 2018 04:58)  T(F): 97.4 (17 Sep 2018 11:47), Max: 97.8 (17 Sep 2018 04:58)  HR: 55 (17 Sep 2018 11:47) (55 - 75)  BP: 136/46 (17 Sep 2018 11:47) (136/46 - 157/47)  BP(mean): --  RR: 17 (17 Sep 2018 11:47) (17 - 18)  SpO2: 94% (17 Sep 2018 11:47) (94% - 97%)    A/P  Hypertensive urgency   -  initially treated as acute new onset CHF but doubt CHF  - BP better controlled ('s)  - s/p IV lasix transitioned to PO  - con't home dose clonidine 0.2 bid / norvasc   - added lopressor 25mg bid this adm optimal BP   - CT chest devoid of PNA or CHF  - TTE--> mild AR / normal LVEF  - appreciate cardiology consult   - avoid ACEi/ARB NSAIDS due to CKD    MELISSA on CKD stage 3 with chronic normal anion gap - gradual downtrend in crt  - renal US  c/w chronic disease & decrease kidney size appropriate in this 88 yr old woman with HTN    Hypothyroidism  -on levothyroxine    Dementia  -continue home meds    GERD  -on PPI    Chronic pain syndromes  - outpt management    SCD for DVT ppx   no AC due to hx of bleed in past

## 2018-09-17 NOTE — DISCHARGE NOTE ADULT - CARE PLAN
Principal Discharge DX:	Uncontrolled hypertension  Goal:	Controlled blood pressure  150/90 or less  Assessment and plan of treatment:	Take your medications as prescribed  low salt diet  follow up with your cardiologist or primary care physician within 1 week  BP monitoring with home care agency  Secondary Diagnosis:	Chronic kidney disease, unspecified CKD stage  Goal:	Optimal renal function  Assessment and plan of treatment:	follow up with your primary care provider Principal Discharge DX:	Uncontrolled hypertension  Goal:	Controlled blood pressure  150/90 or less  Assessment and plan of treatment:	Take your medications as prescribed  low salt diet  follow up with your cardiologist or primary care physician within 1 week  BP monitoring with home care agency  Secondary Diagnosis:	Chronic kidney disease, unspecified CKD stage  Goal:	Optimal renal function  Assessment and plan of treatment:	follow up with your nephrologist within 1 week

## 2018-09-17 NOTE — PROGRESS NOTE ADULT - REASON FOR ADMISSION
complain of uncontrolled BP

## 2018-09-17 NOTE — DISCHARGE NOTE ADULT - OTHER SIGNIFICANT FINDINGS
< from: CT Chest No Cont (09.15.18 @ 11:24) >  IMPRESSION: No evidence of congestive heart failure or pneumonia.Moderate biliary ductal dilatation, unchanged ascompared with July 22, 2018.    < end of copied text >    < from: US Kidney and Bladder (09.17.18 @ 09:51) >  IMPRESSION: Chronic bilateral medical renal disease without evidence for post renal obstruction.    < end of copied text >      < from: Transthoracic Echocardiogram (09.14.18 @ 10:00) >   Summary     Mild concentric left ventricular hypertrophy is present.   Estimated left ventricular ejection fraction is 60-65 %.   The left atrium is normal.   Normal appearing right atrium.   Normal appearing right ventricle structure and function.   Fibrocalcific changes noted to the Aortic valve leaflets with preserved  leaflet excursion.   Mild (1+) aortic regurgitation is present.   Fibrocalcific changes noted to the mitral valve leaflets with minimally  restricted leaflet excursion.   Moderate mitral annular calcification is present.   Mild (1+) mitral regurgitation is present.   EA reversal of the mitral inflow consistent with reduced compliance of the left ventricle   Normal appearing tricuspid valve structure and function.   Mild (1+) tricuspid valve regurgitation is present.   Normal appearing pulmonic valve structure and function.   No evidence of pericardial effusion.   No evidence of pleural effusion.    < end of copied text >

## 2018-09-17 NOTE — DISCHARGE NOTE ADULT - PROVIDER TOKENS
TOKEMIL:'[id number]:'[id number]' TOKEN:'[id number]:'[id number]:'[id number]'

## 2018-09-17 NOTE — DISCHARGE NOTE ADULT - CARE PROVIDERS DIRECT ADDRESSES
[email or web address],[email or web address] [email or web address],DirectAddress_Unknown,DirectAddress_Unknown

## 2018-09-18 PROBLEM — I10 ESSENTIAL (PRIMARY) HYPERTENSION: Chronic | Status: ACTIVE | Noted: 2018-09-14

## 2018-09-18 PROBLEM — N18.9 CHRONIC KIDNEY DISEASE, UNSPECIFIED: Chronic | Status: ACTIVE | Noted: 2018-09-14

## 2018-09-18 NOTE — DISCUSSION/SUMMARY
[Home] : patient was discharged to home [Med Rec Performed] : med rec performed [Follow Up Call to Patient's Family] : follow up call to patient's family [Follow Up Appt with Provider within 7 days] : follow up appt with provider within 7 days

## 2018-09-21 ENCOUNTER — EMERGENCY (EMERGENCY)
Facility: HOSPITAL | Age: 83
LOS: 0 days | Discharge: HOME | End: 2018-09-21
Attending: EMERGENCY MEDICINE | Admitting: EMERGENCY MEDICINE
Payer: MEDICARE

## 2018-09-21 VITALS
DIASTOLIC BLOOD PRESSURE: 82 MMHG | HEART RATE: 87 BPM | TEMPERATURE: 98 F | WEIGHT: 166.89 LBS | SYSTOLIC BLOOD PRESSURE: 161 MMHG | HEIGHT: 64 IN | OXYGEN SATURATION: 98 % | RESPIRATION RATE: 16 BRPM

## 2018-09-21 VITALS
RESPIRATION RATE: 17 BRPM | TEMPERATURE: 98 F | HEART RATE: 109 BPM | DIASTOLIC BLOOD PRESSURE: 84 MMHG | OXYGEN SATURATION: 98 % | SYSTOLIC BLOOD PRESSURE: 158 MMHG

## 2018-09-21 DIAGNOSIS — Z79.82 LONG TERM (CURRENT) USE OF ASPIRIN: ICD-10-CM

## 2018-09-21 DIAGNOSIS — F03.90 UNSPECIFIED DEMENTIA WITHOUT BEHAVIORAL DISTURBANCE: ICD-10-CM

## 2018-09-21 DIAGNOSIS — N17.9 ACUTE KIDNEY FAILURE, UNSPECIFIED: ICD-10-CM

## 2018-09-21 DIAGNOSIS — I13.0 HYPERTENSIVE HEART AND CHRONIC KIDNEY DISEASE WITH HEART FAILURE AND STAGE 1 THROUGH STAGE 4 CHRONIC KIDNEY DISEASE, OR UNSPECIFIED CHRONIC KIDNEY DISEASE: ICD-10-CM

## 2018-09-21 DIAGNOSIS — N18.3 CHRONIC KIDNEY DISEASE, STAGE 3 (MODERATE): ICD-10-CM

## 2018-09-21 DIAGNOSIS — I10 ESSENTIAL (PRIMARY) HYPERTENSION: ICD-10-CM

## 2018-09-21 DIAGNOSIS — I50.9 HEART FAILURE, UNSPECIFIED: ICD-10-CM

## 2018-09-21 DIAGNOSIS — I12.9 HYPERTENSIVE CHRONIC KIDNEY DISEASE WITH STAGE 1 THROUGH STAGE 4 CHRONIC KIDNEY DISEASE, OR UNSPECIFIED CHRONIC KIDNEY DISEASE: ICD-10-CM

## 2018-09-21 DIAGNOSIS — Z79.899 OTHER LONG TERM (CURRENT) DRUG THERAPY: ICD-10-CM

## 2018-09-21 DIAGNOSIS — T50.2X5A ADVERSE EFFECT OF CARBONIC-ANHYDRASE INHIBITORS, BENZOTHIADIAZIDES AND OTHER DIURETICS, INITIAL ENCOUNTER: ICD-10-CM

## 2018-09-21 DIAGNOSIS — G89.4 CHRONIC PAIN SYNDROME: ICD-10-CM

## 2018-09-21 DIAGNOSIS — Z88.5 ALLERGY STATUS TO NARCOTIC AGENT: ICD-10-CM

## 2018-09-21 DIAGNOSIS — E87.2 ACIDOSIS: ICD-10-CM

## 2018-09-21 DIAGNOSIS — F32.9 MAJOR DEPRESSIVE DISORDER, SINGLE EPISODE, UNSPECIFIED: ICD-10-CM

## 2018-09-21 DIAGNOSIS — I16.0 HYPERTENSIVE URGENCY: ICD-10-CM

## 2018-09-21 DIAGNOSIS — N18.9 CHRONIC KIDNEY DISEASE, UNSPECIFIED: ICD-10-CM

## 2018-09-21 DIAGNOSIS — Z88.6 ALLERGY STATUS TO ANALGESIC AGENT: ICD-10-CM

## 2018-09-21 DIAGNOSIS — K21.9 GASTRO-ESOPHAGEAL REFLUX DISEASE WITHOUT ESOPHAGITIS: ICD-10-CM

## 2018-09-21 DIAGNOSIS — E03.9 HYPOTHYROIDISM, UNSPECIFIED: ICD-10-CM

## 2018-09-21 LAB
ALBUMIN SERPL ELPH-MCNC: 4.1 G/DL — SIGNIFICANT CHANGE UP (ref 3.3–5)
ALP SERPL-CCNC: 70 U/L — SIGNIFICANT CHANGE UP (ref 40–120)
ALT FLD-CCNC: 23 U/L — SIGNIFICANT CHANGE UP (ref 12–78)
ANION GAP SERPL CALC-SCNC: 11 MMOL/L — SIGNIFICANT CHANGE UP (ref 5–17)
AST SERPL-CCNC: 17 U/L — SIGNIFICANT CHANGE UP (ref 15–37)
BASOPHILS # BLD AUTO: 0.05 K/UL — SIGNIFICANT CHANGE UP (ref 0–0.2)
BASOPHILS NFR BLD AUTO: 0.9 % — SIGNIFICANT CHANGE UP (ref 0–2)
BILIRUB SERPL-MCNC: 0.3 MG/DL — SIGNIFICANT CHANGE UP (ref 0.2–1.2)
BUN SERPL-MCNC: 39 MG/DL — HIGH (ref 7–23)
CALCIUM SERPL-MCNC: 9.9 MG/DL — SIGNIFICANT CHANGE UP (ref 8.5–10.1)
CHLORIDE SERPL-SCNC: 113 MMOL/L — HIGH (ref 96–108)
CO2 SERPL-SCNC: 21 MMOL/L — LOW (ref 22–31)
CREAT SERPL-MCNC: 1.56 MG/DL — HIGH (ref 0.5–1.3)
EOSINOPHIL # BLD AUTO: 0.22 K/UL — SIGNIFICANT CHANGE UP (ref 0–0.5)
EOSINOPHIL NFR BLD AUTO: 3.8 % — SIGNIFICANT CHANGE UP (ref 0–6)
GLUCOSE SERPL-MCNC: 99 MG/DL — SIGNIFICANT CHANGE UP (ref 70–99)
HCT VFR BLD CALC: 34.9 % — SIGNIFICANT CHANGE UP (ref 34.5–45)
HGB BLD-MCNC: 11.5 G/DL — SIGNIFICANT CHANGE UP (ref 11.5–15.5)
IMM GRANULOCYTES NFR BLD AUTO: 0.3 % — SIGNIFICANT CHANGE UP (ref 0–1.5)
LYMPHOCYTES # BLD AUTO: 0.81 K/UL — LOW (ref 1–3.3)
LYMPHOCYTES # BLD AUTO: 13.9 % — SIGNIFICANT CHANGE UP (ref 13–44)
MCHC RBC-ENTMCNC: 31.9 PG — SIGNIFICANT CHANGE UP (ref 27–34)
MCHC RBC-ENTMCNC: 33 GM/DL — SIGNIFICANT CHANGE UP (ref 32–36)
MCV RBC AUTO: 96.9 FL — SIGNIFICANT CHANGE UP (ref 80–100)
MONOCYTES # BLD AUTO: 0.38 K/UL — SIGNIFICANT CHANGE UP (ref 0–0.9)
MONOCYTES NFR BLD AUTO: 6.5 % — SIGNIFICANT CHANGE UP (ref 2–14)
NEUTROPHILS # BLD AUTO: 4.34 K/UL — SIGNIFICANT CHANGE UP (ref 1.8–7.4)
NEUTROPHILS NFR BLD AUTO: 74.6 % — SIGNIFICANT CHANGE UP (ref 43–77)
NRBC # BLD: 0 /100 WBCS — SIGNIFICANT CHANGE UP (ref 0–0)
PLATELET # BLD AUTO: 265 K/UL — SIGNIFICANT CHANGE UP (ref 150–400)
POTASSIUM SERPL-MCNC: 3.9 MMOL/L — SIGNIFICANT CHANGE UP (ref 3.5–5.3)
POTASSIUM SERPL-SCNC: 3.9 MMOL/L — SIGNIFICANT CHANGE UP (ref 3.5–5.3)
PROT SERPL-MCNC: 8.2 GM/DL — SIGNIFICANT CHANGE UP (ref 6–8.3)
RBC # BLD: 3.6 M/UL — LOW (ref 3.8–5.2)
RBC # FLD: 12.9 % — SIGNIFICANT CHANGE UP (ref 10.3–14.5)
SODIUM SERPL-SCNC: 145 MMOL/L — SIGNIFICANT CHANGE UP (ref 135–145)
WBC # BLD: 5.82 K/UL — SIGNIFICANT CHANGE UP (ref 3.8–10.5)
WBC # FLD AUTO: 5.82 K/UL — SIGNIFICANT CHANGE UP (ref 3.8–10.5)

## 2018-09-21 PROCEDURE — 99283 EMERGENCY DEPT VISIT LOW MDM: CPT

## 2018-09-21 PROCEDURE — 93010 ELECTROCARDIOGRAM REPORT: CPT

## 2018-09-21 NOTE — ED ADULT NURSE NOTE - CHPI ED NUR SYMPTOMS NEG
no chills/no decreased eating/drinking/no weakness/no fever/no vomiting/no dizziness/no nausea/no pain/no tingling

## 2018-09-21 NOTE — ED PROVIDER NOTE - MEDICAL DECISION MAKING DETAILS
Pt asymptomatic.  EKG WNL.  Labs WNL.  BP much improved from outside.  Okay for d/c home.  Continue antihypertensives at home.

## 2018-09-21 NOTE — ED PROVIDER NOTE - OBJECTIVE STATEMENT
87 y/o F with PMHx of HTN and CKD presenting to the ED via EMS c/o HTN. Reports that she was sent into ED for evaluation of high BP this morning. /82 in triage vitals. Pt asymptomatic in ED. Denies any HA, CP, SOB, abd pain, N/V/D. States that she has not been in compliance with her BP medications. Recently admitted to  on 09/13/2018 for HTN. Allergic to Oxycodone.

## 2018-09-21 NOTE — ED ADULT TRIAGE NOTE - CHIEF COMPLAINT QUOTE
pt has hypertension, refusing to take BP medications, was seen in ED last week for similar reasons, bp at home 220/110

## 2018-09-24 NOTE — DISCUSSION/SUMMARY
[Home] : patient was discharged to home [Follow Up Call to Patient's Family] : follow up call to patient's family [Follow Up Appt with Provider within 7 days] : follow up appt with provider within 7 days

## 2018-09-25 ENCOUNTER — APPOINTMENT (OUTPATIENT)
Dept: INTERNAL MEDICINE | Facility: CLINIC | Age: 83
End: 2018-09-25
Payer: MEDICARE

## 2018-09-25 VITALS
BODY MASS INDEX: 26.22 KG/M2 | HEIGHT: 63 IN | OXYGEN SATURATION: 95 % | HEART RATE: 68 BPM | SYSTOLIC BLOOD PRESSURE: 150 MMHG | WEIGHT: 148 LBS | DIASTOLIC BLOOD PRESSURE: 68 MMHG | TEMPERATURE: 97.7 F | RESPIRATION RATE: 18 BRPM

## 2018-09-25 DIAGNOSIS — J38.00 PARALYSIS OF VOCAL CORDS AND LARYNX, UNSPECIFIED: ICD-10-CM

## 2018-09-25 DIAGNOSIS — I49.49 OTHER PREMATURE DEPOLARIZATION: ICD-10-CM

## 2018-09-25 DIAGNOSIS — M81.6 LOCALIZED OSTEOPOROSIS [LEQUESNE]: ICD-10-CM

## 2018-09-25 DIAGNOSIS — M54.16 RADICULOPATHY, LUMBAR REGION: ICD-10-CM

## 2018-09-25 DIAGNOSIS — Z87.448 PERSONAL HISTORY OF OTHER DISEASES OF URINARY SYSTEM: ICD-10-CM

## 2018-09-25 DIAGNOSIS — R53.82 CHRONIC FATIGUE, UNSPECIFIED: ICD-10-CM

## 2018-09-25 DIAGNOSIS — M51.9 UNSPECIFIED THORACIC, THORACOLUMBAR AND LUMBOSACRAL INTERVERTEBRAL DISC DISORDER: ICD-10-CM

## 2018-09-25 DIAGNOSIS — K44.9 GASTRO-ESOPHAGEAL REFLUX DISEASE W/OUT ESOPHAGITIS: ICD-10-CM

## 2018-09-25 DIAGNOSIS — K21.9 GASTRO-ESOPHAGEAL REFLUX DISEASE W/OUT ESOPHAGITIS: ICD-10-CM

## 2018-09-25 DIAGNOSIS — D86.83 SARCOID IRIDOCYCLITIS: ICD-10-CM

## 2018-09-25 DIAGNOSIS — R45.89 OTHER SYMPTOMS AND SIGNS INVOLVING EMOTIONAL STATE: ICD-10-CM

## 2018-09-25 DIAGNOSIS — N17.9 ACUTE KIDNEY FAILURE, UNSPECIFIED: ICD-10-CM

## 2018-09-25 DIAGNOSIS — K92.2 GASTROINTESTINAL HEMORRHAGE, UNSPECIFIED: ICD-10-CM

## 2018-09-25 DIAGNOSIS — Z87.891 PERSONAL HISTORY OF NICOTINE DEPENDENCE: ICD-10-CM

## 2018-09-25 DIAGNOSIS — S22.39XA FRACTURE OF ONE RIB, UNSPECIFIED SIDE, INITIAL ENCOUNTER FOR CLOSED FRACTURE: ICD-10-CM

## 2018-09-25 DIAGNOSIS — Z23 ENCOUNTER FOR IMMUNIZATION: ICD-10-CM

## 2018-09-25 DIAGNOSIS — Z84.1 FAMILY HISTORY OF DISORDERS OF KIDNEY AND URETER: ICD-10-CM
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
## 2018-09-25 DIAGNOSIS — J38.5 LARYNGEAL SPASM: ICD-10-CM

## 2018-09-25 DIAGNOSIS — D86.9 SARCOIDOSIS, UNSPECIFIED: ICD-10-CM

## 2018-09-25 DIAGNOSIS — R79.89 OTHER SPECIFIED ABNORMAL FINDINGS OF BLOOD CHEMISTRY: ICD-10-CM

## 2018-09-25 DIAGNOSIS — Z80.8 FAMILY HISTORY OF MALIGNANT NEOPLASM OF OTHER ORGANS OR SYSTEMS: ICD-10-CM

## 2018-09-25 DIAGNOSIS — M54.5 LOW BACK PAIN: ICD-10-CM

## 2018-09-25 DIAGNOSIS — J44.9 CHRONIC OBSTRUCTIVE PULMONARY DISEASE, UNSPECIFIED: ICD-10-CM

## 2018-09-25 PROCEDURE — G0008: CPT

## 2018-09-25 PROCEDURE — 99496 TRANSJ CARE MGMT HIGH F2F 7D: CPT | Mod: 25

## 2018-09-25 PROCEDURE — 90662 IIV NO PRSV INCREASED AG IM: CPT

## 2018-09-25 RX ORDER — CLONIDINE HYDROCHLORIDE 0.3 MG/1
TABLET ORAL
Refills: 0 | Status: COMPLETED | COMMUNITY

## 2018-09-25 RX ORDER — MULTIVIT-MIN/FOLIC/VIT K/LYCOP 400-300MCG
250 TABLET ORAL
Refills: 0 | Status: ACTIVE | COMMUNITY

## 2018-09-25 RX ORDER — LOSARTAN POTASSIUM 50 MG/1
50 TABLET, FILM COATED ORAL
Qty: 90 | Refills: 3 | Status: COMPLETED | COMMUNITY
Start: 2018-03-20 | End: 2018-09-25

## 2018-09-25 NOTE — COUNSELING
[Weight management counseling provided] : Weight management [Healthy eating counseling provided] : healthy eating [Activity counseling provided] : activity [Behavioral health counseling provided] : behavioral health  [Low Fat Diet] : Low fat diet [Low Salt Diet] : Low salt diet [Walking] : Walking [Patient Non-adherent to care plan] : Patient non-adherent to care plan [Needs reinforcement, provided] : Patient needs reinforcement on understanding lifestyle changes and  the steps needed to achieve self management goals and reinforcement was provided

## 2018-09-25 NOTE — HISTORY OF PRESENT ILLNESS
[FreeTextEntry8] : Accel HTN, falls and right shoulder pain.\par \par The patient was admitted to  via the ER in 7-22-18 after a fall at home. She suffered right shoulder injury and left 8-9 ant/lat rib fractures with chest wall contusion. This admission was complicated by upper GI bleed, anemia and MELISSA. Endoscopy was deferred and ARB was stopped. SHe returned to the ER twice since discharge on 7-30-18 for accel HTN measured by VNS at the patient's home. Last admit was 9-21-18 with /110.\par \par The patient's blood pressure has improved but SBP remains over 140 at home. She has not scheduled f/u with cardiology or nephrology as recommended. She is eating well and instructed to use walker or cane at home but refuses despite family urgings. "That's why she falls." There is persistent right shoulder pain and limited ROM but no edema or cyanosis of right UE. She denies persistent left ant chest wall pain and TAYLOR is stable. She denies fever, chills, wheeze, palpitation, LH or dizziness. She is eating well and denies abd pain, nausea or recurrent emesis. BRBPR and melena are not present. [Spouse] : spouse [Family Member] : family member

## 2018-09-25 NOTE — REVIEW OF SYSTEMS
[Fever] : no fever [Chills] : no chills [Fatigue] : fatigue [Night Sweats] : no night sweats [Recent Change In Weight] : ~T recent weight change [Pain] : no pain [Vision Problems] : vision problems [Hearing Loss] : hearing loss [Nosebleed] : no nosebleeds [Hoarseness] : hoarseness [Sore Throat] : no sore throat [Postnasal Drip] : no postnasal drip [Chest Pain] : no chest pain [Palpitations] : no palpitations [Leg Claudication] : no leg claudication [Lower Ext Edema] : lower extremity edema [Orthopnea] : no orthopnea [Paroysmal Nocturnal Dyspnea] : no paroysmal nocturnal dyspnea [Wheezing] : no wheezing [Cough] : cough [Dyspnea on Exertion] : dyspnea on exertion [Abdominal Pain] : no abdominal pain [Nausea] : no nausea [Constipation] : no constipation [Diarrhea] : diarrhea [Vomiting] : no vomiting [Heartburn] : no heartburn [Melena] : no melena [Dysuria] : no dysuria [Hematuria] : no hematuria [Joint Pain] : joint pain [Joint Stiffness] : joint stiffness [Joint Swelling] : no joint swelling [Muscle Weakness] : muscle weakness [Muscle Pain] : no muscle pain [Back Pain] : no back pain [Itching] : no itching [Skin Rash] : no skin rash [Headache] : no headache [Dizziness] : no dizziness [Fainting] : no fainting [Memory Loss] : memory loss [Unsteady Walking] : ataxia [Suicidal] : not suicidal [Insomnia] : no insomnia [Anxiety] : anxiety [Depression] : depression [Easy Bleeding] : easy bleeding [Swollen Glands] : no swollen glands [Negative] : Heme/Lymph

## 2018-09-25 NOTE — PLAN
[FreeTextEntry1] : Influenza vaccine today.\par Metoprolol increased to 50mg AM and 25mg PM.\par No ARB or ACE-I due to CKD. \par Low fat/chol/Na diet.\par Must use walker or cane to ambulate.\par Fall precautions reviewed with patient and family. She must be compliant.\par Dr Soto for ortho consult.\par Dr Hernández for cardiology f/u.\par Nephrology  and GI f/u refused by patient.\par Labs by [facility] in 2 weeks. See RX copy.\par She will f/u 3-4 mos or sooner PRN\par \par

## 2018-09-25 NOTE — PHYSICAL EXAM
[No Acute Distress] : no acute distress [Well Developed] : well developed [Normal Sclera/Conjunctiva] : normal sclera/conjunctiva [PERRL] : pupils equal round and reactive to light [EOMI] : extraocular movements intact [Normal Outer Ear/Nose] : the outer ears and nose were normal in appearance [Normal Oropharynx] : the oropharynx was normal [No JVD] : no jugular venous distention [Supple] : supple [No Lymphadenopathy] : no lymphadenopathy [Thyroid Normal, No Nodules] : the thyroid was normal and there were no nodules present [No Respiratory Distress] : no respiratory distress  [Clear to Auscultation] : lungs were clear to auscultation bilaterally [No Accessory Muscle Use] : no accessory muscle use [Normal Rate] : normal rate  [Regular Rhythm] : with a regular rhythm [Normal S1, S2] : normal S1 and S2 [No Varicosities] : no varicosities [No Extremity Clubbing/Cyanosis] : no extremity clubbing/cyanosis [Soft] : abdomen soft [Non Tender] : non-tender [No HSM] : no HSM [Normal Bowel Sounds] : normal bowel sounds [Normal Supraclavicular Nodes] : no supraclavicular lymphadenopathy [Normal Anterior Cervical Nodes] : no anterior cervical lymphadenopathy [Kyphosis] : no kyphosis [Scoliosis] : no scoliosis [No Joint Swelling] : no joint swelling [Grossly Normal Strength/Tone] : grossly normal strength/tone [No Rash] : no rash [No Skin Lesions] : no skin lesions [Coordination Grossly Intact] : coordination grossly intact [No Focal Deficits] : no focal deficits [Memory Grossly Normal] : memory grossly normal [Alert and Oriented x3] : oriented to person, place, and time [Normal Insight/Judgement] : insight and judgment were intact [de-identified] : hhoarse voice, overweight and chronically ill appearing. [de-identified] : wearing glasses [de-identified] : no rub and no flail segments. [de-identified] : 1-2/6 MAK LSB [de-identified] : 1+ pedal edema [de-identified] : obese [de-identified] : poor muscle strength. Unsteady gait using walker then wheelchair. [de-identified] : agitated at times then calm and cheerful.

## 2018-09-27 ENCOUNTER — MEDICATION RENEWAL (OUTPATIENT)
Age: 83
End: 2018-09-27

## 2018-09-27 RX ORDER — METOPROLOL TARTRATE 25 MG/1
25 TABLET, FILM COATED ORAL 3 TIMES DAILY
Qty: 270 | Refills: 1 | Status: ACTIVE | COMMUNITY
Start: 2018-09-25 | End: 1900-01-01

## 2018-10-17 ENCOUNTER — RX RENEWAL (OUTPATIENT)
Age: 83
End: 2018-10-17

## 2018-10-17 RX ORDER — CHOLECALCIFEROL (VITAMIN D3) 50 MCG
50 MCG CAPSULE ORAL
Qty: 30 | Refills: 0 | Status: ACTIVE | COMMUNITY
Start: 2018-10-17 | End: 1900-01-01

## 2018-10-24 ENCOUNTER — APPOINTMENT (OUTPATIENT)
Dept: INTERNAL MEDICINE | Facility: CLINIC | Age: 83
End: 2018-10-24

## 2018-10-31 ENCOUNTER — APPOINTMENT (OUTPATIENT)
Dept: INTERNAL MEDICINE | Facility: CLINIC | Age: 83
End: 2018-10-31
Payer: MEDICARE

## 2018-10-31 VITALS
OXYGEN SATURATION: 95 % | HEART RATE: 62 BPM | SYSTOLIC BLOOD PRESSURE: 120 MMHG | RESPIRATION RATE: 18 BRPM | HEIGHT: 63 IN | TEMPERATURE: 97.4 F | DIASTOLIC BLOOD PRESSURE: 64 MMHG | WEIGHT: 144 LBS | BODY MASS INDEX: 25.52 KG/M2

## 2018-10-31 DIAGNOSIS — D50.9 IRON DEFICIENCY ANEMIA, UNSPECIFIED: ICD-10-CM

## 2018-10-31 DIAGNOSIS — R53.83 OTHER FATIGUE: ICD-10-CM

## 2018-10-31 DIAGNOSIS — I10 ESSENTIAL (PRIMARY) HYPERTENSION: ICD-10-CM

## 2018-10-31 DIAGNOSIS — R41.89 OTHER SYMPTOMS AND SIGNS INVOLVING COGNITIVE FUNCTIONS AND AWARENESS: ICD-10-CM

## 2018-10-31 DIAGNOSIS — N18.3 CHRONIC KIDNEY DISEASE, STAGE 3 (MODERATE): ICD-10-CM

## 2018-10-31 DIAGNOSIS — R26.81 UNSTEADINESS ON FEET: ICD-10-CM

## 2018-10-31 PROCEDURE — 99214 OFFICE O/P EST MOD 30 MIN: CPT

## 2018-10-31 RX ORDER — ASPIRIN ENTERIC COATED TABLETS 81 MG 81 MG/1
81 TABLET, DELAYED RELEASE ORAL
Refills: 0 | Status: ACTIVE | COMMUNITY

## 2018-10-31 RX ORDER — DONEPEZIL HYDROCHLORIDE 23 MG/1
TABLET, FILM COATED ORAL
Refills: 0 | Status: DISCONTINUED | COMMUNITY
End: 2018-10-31

## 2018-10-31 RX ORDER — OMEPRAZOLE 40 MG/1
40 CAPSULE, DELAYED RELEASE ORAL
Qty: 90 | Refills: 0 | Status: DISCONTINUED | COMMUNITY
Start: 2017-08-02 | End: 2018-10-31

## 2018-10-31 RX ORDER — ASCORBIC ACID 500 MG
500 TABLET ORAL
Refills: 0 | Status: DISCONTINUED | COMMUNITY

## 2018-10-31 RX ORDER — POTASSIUM CHLORIDE 1500 MG/1
20 TABLET, EXTENDED RELEASE ORAL
Qty: 30 | Refills: 0 | Status: DISCONTINUED | COMMUNITY
Start: 2018-09-12

## 2018-10-31 NOTE — CURRENT MEDS
[Lack of understanding] : lack of understanding [Other ___] : [unfilled] [Takes medication as prescribed] : does not take

## 2018-10-31 NOTE — ASSESSMENT
[FreeTextEntry1] : \par \par Patient currently is cooperative and she is aware of my concerns due to non-compliance and verbalizes need for proper medication management especially as it relates to her BP.  \par All medications and indications  reviewed with pt and family. \par She has an appt tomorrow with Nephrologist .\par She agrees to see  for JASVIR.  \par Nutritional supplementation with Boost encouraged.   \par Use of walker/wheelchair strongly advised.  \par FU \par Again discussed [facility] evaluation.  Multiple referrals provided.   \par FU 3 months.

## 2018-10-31 NOTE — PHYSICAL EXAM
[No Acute Distress] : no acute distress [Well Developed] : well developed [Normal Sclera/Conjunctiva] : normal sclera/conjunctiva [PERRL] : pupils equal round and reactive to light [EOMI] : extraocular movements intact [Normal Outer Ear/Nose] : the outer ears and nose were normal in appearance [Normal Oropharynx] : the oropharynx was normal [No JVD] : no jugular venous distention [Supple] : supple [No Lymphadenopathy] : no lymphadenopathy [Thyroid Normal, No Nodules] : the thyroid was normal and there were no nodules present [No Respiratory Distress] : no respiratory distress  [Clear to Auscultation] : lungs were clear to auscultation bilaterally [No Accessory Muscle Use] : no accessory muscle use [Normal Rate] : normal rate  [Regular Rhythm] : with a regular rhythm [Normal S1, S2] : normal S1 and S2 [No Varicosities] : no varicosities [No Edema] : there was no peripheral edema [No Extremity Clubbing/Cyanosis] : no extremity clubbing/cyanosis [Soft] : abdomen soft [Non Tender] : non-tender [No HSM] : no HSM [Normal Bowel Sounds] : normal bowel sounds [Normal Supraclavicular Nodes] : no supraclavicular lymphadenopathy [Normal Anterior Cervical Nodes] : no anterior cervical lymphadenopathy [No Joint Swelling] : no joint swelling [Grossly Normal Strength/Tone] : grossly normal strength/tone [No Rash] : no rash [No Skin Lesions] : no skin lesions [Coordination Grossly Intact] : coordination grossly intact [No Focal Deficits] : no focal deficits [Memory Grossly Normal] : memory grossly normal [Alert and Oriented x3] : oriented to person, place, and time [Normal Insight/Judgement] : insight and judgment were intact [Kyphosis] : no kyphosis [Scoliosis] : no scoliosis [de-identified] : hoarse voice, overweight and chronically ill appearing. [de-identified] : wearing glasses [de-identified] : 1-2/6 MAK LSB [de-identified] : obese [de-identified] : poor muscle strength. Unsteady gait using wheeled walker/seat [de-identified] : agitated at times then calm and cheerful.

## 2018-10-31 NOTE — HISTORY OF PRESENT ILLNESS
[Spouse] : spouse [Family Member] : family member [FreeTextEntry1] : FU HTN/anemia [de-identified] : Having home care with PT.  Per VNS and daughter pt had been non-compliant with meds but this is improving.  She also becomes combative and angry which daughter states "this is how she has always been". She is confused at times. [facility] recommended in past but didn't FU.   Recent [facility] BW showed JASVIR.  PT feels tired and weak but denies melena or BRBPR.  NO abd pain.  Poor appetite.

## 2018-11-02 ENCOUNTER — MEDICATION RENEWAL (OUTPATIENT)
Age: 83
End: 2018-11-02

## 2018-11-02 RX ORDER — PANTOPRAZOLE 40 MG/1
40 TABLET, DELAYED RELEASE ORAL DAILY
Qty: 30 | Refills: 5 | Status: ACTIVE | COMMUNITY
Start: 1900-01-01 | End: 1900-01-01

## 2018-11-14 ENCOUNTER — MEDICATION RENEWAL (OUTPATIENT)
Age: 83
End: 2018-11-14

## 2018-11-14 RX ORDER — CHOLECALCIFEROL (VITAMIN D3) 50 MCG
50 MCG TABLET ORAL DAILY
Qty: 90 | Refills: 0 | Status: ACTIVE | COMMUNITY
Start: 1900-01-01 | End: 1900-01-01

## 2018-11-14 RX ORDER — DONEPEZIL HYDROCHLORIDE 5 MG/1
5 TABLET ORAL
Qty: 30 | Refills: 0 | Status: ACTIVE | COMMUNITY
Start: 2018-10-17 | End: 1900-01-01

## 2018-11-14 RX ORDER — CHLORHEXIDINE GLUCONATE 4 %
325 (65 FE) LIQUID (ML) TOPICAL
Qty: 60 | Refills: 0 | Status: ACTIVE | COMMUNITY
Start: 2018-10-17 | End: 1900-01-01

## 2018-11-28 NOTE — PATIENT PROFILE ADULT. - NS PRO PASSIVE SMOKE EXP
Implemented All Fall Risk Interventions:  [place] to call system. Call bell, personal items and telephone within reach. Instruct patient to call for assistance. Room bathroom lighting operational. Non-slip footwear when patient is off stretcher. Physically safe environment: no spills, clutter or unnecessary equipment. Stretcher in lowest position, wheels locked, appropriate side rails in place. Provide visual cue, wrist band, yellow gown, etc. Monitor gait and stability. Monitor for mental status changes and reorient to person, place, and time. Review medications for side effects contributing to fall risk. Reinforce activity limits and safety measures with patient and family. No

## 2018-12-05 ENCOUNTER — RX RENEWAL (OUTPATIENT)
Age: 83
End: 2018-12-05

## 2018-12-05 ENCOUNTER — MEDICATION RENEWAL (OUTPATIENT)
Age: 83
End: 2018-12-05

## 2018-12-05 RX ORDER — SERTRALINE HYDROCHLORIDE 50 MG/1
50 TABLET, FILM COATED ORAL
Qty: 30 | Refills: 5 | Status: ACTIVE | COMMUNITY
Start: 2018-12-05 | End: 1900-01-01

## 2018-12-05 RX ORDER — AMLODIPINE BESYLATE 10 MG/1
10 TABLET ORAL
Qty: 30 | Refills: 5 | Status: ACTIVE | COMMUNITY
Start: 2018-12-05 | End: 1900-01-01

## 2018-12-21 ENCOUNTER — OTHER (OUTPATIENT)
Age: 83
End: 2018-12-21

## 2018-12-21 RX ORDER — SERTRALINE HYDROCHLORIDE 50 MG/1
50 TABLET, FILM COATED ORAL
Qty: 90 | Refills: 1 | Status: ACTIVE | COMMUNITY
Start: 2018-03-12 | End: 1900-01-01

## 2019-02-05 ENCOUNTER — APPOINTMENT (OUTPATIENT)
Dept: INTERNAL MEDICINE | Facility: CLINIC | Age: 84
End: 2019-02-05

## 2019-07-16 NOTE — H&P ADULT - ASSESSMENT
Thank you Katiana,  Sure, please put another prescription for FreeStyle Yash. Divina told me it helped her a lot and was very motivating for her to eat healthy, exercise and keep her glucose numbers down. We can try to appeal, if needed, all appeals and PA's usually go to [employer].     VERONIQUE Spears CNP   88y female w/     *recurrent falls, rib pain due to nondisplaced fractures ant/lat left 8-9th ribs  - no loc  - CT head and Cspine unremarkable   - Pain control  - PT eval    *age indeterminant moderate compression fracture in the inferior endplate T9  - due to falls    *CKD3  - receiving ivf, bmp in am    *Status post cholecystectomy with severe intrahepatic and extrahepatic   biliary ductal dilatation  - on imaging.  Normal lfts, no fevers,  no ruq pain    *htn  - start norvasc    *dvt px  - heparin 88y female w/     *recurrent falls, rib pain due to nondisplaced fractures ant/lat left 8-9th ribs w/ small amt edema/hemorrhage adjacent chest wall  - denies syncope  - CT head and Cspine unremarkable   - Trauma surgery eval  - Pain control  - PT eval    *age indeterminant moderate compression fracture in the inferior endplate T9  - due to falls, no back pain     *CKD3  - clinically appears dehydrated, receiving ivf, bmp in am    *Status post cholecystectomy with severe intrahepatic and extrahepatic   biliary ductal dilatation  - on imaging.  Normal lfts, no fevers,  no ruq pain  - repeat labs in am     *htn  - start norvasc  - daughter to bring home meds    *dvt px  - heparin

## 2019-12-03 NOTE — ED ADULT NURSE NOTE - DRUG PRE-SCREENING (DAST -1)
Statement Selected
-- Please use 650mg Tylenol (also called acetaminophen) every 4 hours & 600mg Motrin (also called Advil or ibuprofen) every 6 hours as needed for pain/discomfort/swelling. You can get these without a prescription. Don't use more than 3500mg of Tylenol in any 24-hour period. Make sure your other prescription/over-the-counter medications don't contain any Tylenol so you don't take too much. If you have any stomach discomfort while taking Motrin, you can use TUMS or Pepcid or Zantac (these can all be bought without a prescription).     Follow up with orthopedist in the next week Dr JONO .[phone]    Wear knee immobilizer and use crutches to ambulate    Return to the ER for any concerns

## 2020-08-30 NOTE — PHYSICAL THERAPY INITIAL EVALUATION ADULT - NS ASR RISK AREAS PT EVAL
0 = swallows foods/liquids without difficulty impaired judgment/cognitive impairment/fall/safety awareness

## 2023-10-24 NOTE — PHYSICAL THERAPY INITIAL EVALUATION ADULT - TRANSFER SKILLS, REHAB EVAL
She has sent a [employer] message  It is best to limit sodium in diet but increase fluids aka water  Will have her double them to 40mg  The labs are reassuring   The alk phos can occur from bones so make sure taking calcium and vitamin d for bone health   It is ok to take blood pressure medicaiton at same time independent

## 2024-03-06 NOTE — ED ADULT NURSE NOTE - NS ED NURSE IV DC DT
PA for Mounjaro was denied as patients insurance does not cover medications for weight loss/obesity.   29-Apr-2018 17:48

## 2024-10-17 NOTE — ED ADULT NURSE NOTE - GASTROINTESTINAL WDL
Abdomen soft, nontender, nondistended, bowel sounds present in all 4 quadrants.
Prophylactic measure
Prophylactic measure